# Patient Record
Sex: MALE | Race: BLACK OR AFRICAN AMERICAN | NOT HISPANIC OR LATINO | Employment: OTHER | URBAN - METROPOLITAN AREA
[De-identification: names, ages, dates, MRNs, and addresses within clinical notes are randomized per-mention and may not be internally consistent; named-entity substitution may affect disease eponyms.]

---

## 2017-01-11 ENCOUNTER — HOSPITAL ENCOUNTER (OUTPATIENT)
Dept: SLEEP MEDICINE | Facility: HOSPITAL | Age: 47
Discharge: HOME | End: 2017-01-11
Attending: INTERNAL MEDICINE | Admitting: INTERNAL MEDICINE

## 2017-01-11 PROCEDURE — G0463 HOSPITAL OUTPT CLINIC VISIT: HCPCS

## 2017-01-11 PROCEDURE — 99213 OFFICE O/P EST LOW 20 MIN: CPT | Performed by: INTERNAL MEDICINE

## 2017-01-28 PROBLEM — Z99.89 OSA ON CPAP: Status: ACTIVE | Noted: 2017-01-28

## 2017-01-28 PROBLEM — G47.33 OSA ON CPAP: Status: ACTIVE | Noted: 2017-01-28

## 2017-01-28 PROBLEM — G47.14 HYPERSOMNIA DUE TO MEDICAL CONDITION: Status: ACTIVE | Noted: 2017-01-28

## 2017-01-28 NOTE — PROGRESS NOTES
[facility] Note January 11, 2017  Patient Care Team:  Yuki Montiel MD as PCP - General  Yuki Montiel MD as PCP - Family Medicine    Chief Complaint:  ELENA     Interval History:   The patient was last seen by me in May 2016.  He remained stable without new complaints.  He goes to bed between 11 PM and midnight and awakens at 9 AM.  He sleeps all night.  [place] Sleepiness Scale is normal 3.    Review of Systems:  Recorded on the Sleep Questionnaire.  Unremarkable.    Social History:  He does not smoke cigarettes.  No alcohol.  No caffeine.    Allergies:  No known medical allergies.     Medication Review:  His list was reviewed.  He takes Nuvigil 250 mg one half tab by mouth every morning.    Vital Signs:  Height 71 inches and weight 235 and he is obese with a body mass index of 33.    Physical Exam:    Constitutional:  Well developed black male and appears in no apparent distress.  Awake & oriented times 3.  Normal mood with normal recent and remote memory and normal judgement.  Eyes:  Conjunctivae normal.  Oropharynx:  moist mucous membranes without exudate and a large tongue and class III-IV MP airway and posterior pharyngeal region not well seen.      Results Review:  DME is [facility] and he uses a fullface mask.  Downloads between December 12, 2016 and January 10, 2017 reveals compliance to be 100% average usage 6 hours and 45 minutes and average AHI is normal despite the patient having a large leak.  The patient uses CPAP +12.       Impression:   Obstructive sleep apnea adequately treated with CPAP +12 with good compliance and usage.  The patient has persistent hypersomnolence treated with Nuvigil 250 mg one half tablet every morning.      Plan:  Good sleep hygiene measures should be maintained weight loss would be beneficial.  The patient will continue CPAP +12 as he is doing.  She will also continue Nuvigil as prescribed and a new refill given today.  The patient will  follow-up in 8 or 9 months.      Neil Lam MD  01/28/17  1:32 PM

## 2017-04-28 RX ORDER — LOSARTAN POTASSIUM AND HYDROCHLOROTHIAZIDE 25; 100 MG/1; MG/1
TABLET ORAL
Qty: 30 TABLET | Refills: 0 | Status: SHIPPED | OUTPATIENT
Start: 2017-04-28 | End: 2017-10-13

## 2017-04-28 RX ORDER — AMLODIPINE BESYLATE 10 MG/1
TABLET ORAL
Qty: 30 TABLET | Refills: 0 | Status: SHIPPED | OUTPATIENT
Start: 2017-04-28 | End: 2017-10-26 | Stop reason: SDUPTHER

## 2017-06-09 RX ORDER — AMLODIPINE BESYLATE 10 MG/1
TABLET ORAL
Qty: 30 TABLET | OUTPATIENT
Start: 2017-06-09

## 2017-06-09 RX ORDER — LOSARTAN POTASSIUM AND HYDROCHLOROTHIAZIDE 25; 100 MG/1; MG/1
TABLET ORAL
Qty: 30 TABLET | OUTPATIENT
Start: 2017-06-09

## 2017-09-13 ENCOUNTER — HOSPITAL ENCOUNTER (OUTPATIENT)
Dept: SLEEP MEDICINE | Facility: HOSPITAL | Age: 47
Discharge: HOME | End: 2017-09-13
Admitting: INTERNAL MEDICINE

## 2017-09-13 PROCEDURE — 99213 OFFICE O/P EST LOW 20 MIN: CPT | Performed by: INTERNAL MEDICINE

## 2017-09-13 PROCEDURE — G0463 HOSPITAL OUTPT CLINIC VISIT: HCPCS

## 2017-09-16 NOTE — PROGRESS NOTES
[facility] Note       Patient Care Team:  Yuki Montiel MD as PCP - Family Medicine  Neil Lam MD as Consulting Physician (Sleep Medicine)  Izaiah Nino MD as Consulting Physician (Neurosurgery)    Chief Complaint:  ELENA     Interval History:   The patient was last seen by me in January of this year.  He is stable and unchanged.  He goes to bed 11 PM and awakens at 8:30 AM.  He awakens once during the nighttime.  [place] Sleepiness Scale is normal at 3.    Review of Systems:  Recorded on the Sleep Questionnaire.  Unremarkable .    Social History:  He does not smoke cigarettes.  No alcohol and no caffeine.    Allergies:  No known medical allergies.     Medication Review:  His list was reviewed.  He takes Nuvigil 250 mg, one half tablet every morning.    Vital Signs:  Height 71 inches and weight 250 he needs obese with a body mass index of 35.    Physical Exam:    Constitutional:  Well developed black male and appears in no apparent distress.  Awake & oriented times 3.  Normal mood with normal recent and remote memory and normal judgement.  Eyes:  Conjunctivae normal.  Oropharynx:  moist mucous membranes without exudate and a large tongue and class III-IV MP airway and posterior pharyngeal region not well seen.      Results Review:  DME is [facility] and he uses a fullface mask.  Downloads obtained were between December 12, 2016 and January 10, 2017.  Compliance was demonstrated and he was adequately treated.       Impression:   Obstructive sleep apnea adequately treated with CPAP +12 with good compliance and usage.  The patient has persistent complaints of hypersomnolence that is adequately treated with Nuvigil 250 mg, one half tab every morning.      Plan:  Good sleep hygiene measures should be maintained.  Weight loss would be beneficial in this patient who is obese by BMI.  The patient is benefiting from the treatment being provided.     The patient will continue CPAP +12 and  Nuvigil as outlined.    The patient will call for any problems and will follow up in 8-9 months.      Neil Lam MD  09/16/17  12:55 PM

## 2017-10-13 ENCOUNTER — OFFICE VISIT (OUTPATIENT)
Dept: FAMILY MEDICINE CLINIC | Facility: CLINIC | Age: 47
End: 2017-10-13

## 2017-10-13 VITALS
DIASTOLIC BLOOD PRESSURE: 70 MMHG | HEIGHT: 70 IN | RESPIRATION RATE: 18 BRPM | BODY MASS INDEX: 32.07 KG/M2 | OXYGEN SATURATION: 98 % | HEART RATE: 79 BPM | SYSTOLIC BLOOD PRESSURE: 110 MMHG | WEIGHT: 224 LBS

## 2017-10-13 DIAGNOSIS — E04.9 GOITER: ICD-10-CM

## 2017-10-13 DIAGNOSIS — E78.5 HYPERLIPIDEMIA, UNSPECIFIED HYPERLIPIDEMIA TYPE: ICD-10-CM

## 2017-10-13 DIAGNOSIS — E11.9 TYPE 2 DIABETES MELLITUS WITHOUT COMPLICATION, WITHOUT LONG-TERM CURRENT USE OF INSULIN (HCC): ICD-10-CM

## 2017-10-13 DIAGNOSIS — R79.89 SERUM CREATININE RAISED: ICD-10-CM

## 2017-10-13 DIAGNOSIS — E55.9 HYPOVITAMINOSIS D: ICD-10-CM

## 2017-10-13 DIAGNOSIS — Z23 ENCOUNTER FOR IMMUNIZATION: ICD-10-CM

## 2017-10-13 DIAGNOSIS — Z79.899 DRUG THERAPY: ICD-10-CM

## 2017-10-13 DIAGNOSIS — H54.8 LEGAL BLINDNESS: ICD-10-CM

## 2017-10-13 DIAGNOSIS — I15.2 HYPERTENSION DUE TO ENDOCRINE DISORDER: Primary | ICD-10-CM

## 2017-10-13 DIAGNOSIS — E03.9 HYPOTHYROIDISM, UNSPECIFIED TYPE: ICD-10-CM

## 2017-10-13 PROCEDURE — 99214 OFFICE O/P EST MOD 30 MIN: CPT | Performed by: FAMILY MEDICINE

## 2017-10-13 PROCEDURE — G0008 ADMIN INFLUENZA VIRUS VAC: HCPCS | Performed by: FAMILY MEDICINE

## 2017-10-13 RX ORDER — AMLODIPINE BESYLATE 10 MG/1
TABLET ORAL
COMMUNITY
Start: 2013-08-02 | End: 2017-10-13

## 2017-10-13 RX ORDER — TESTOSTERONE 16.2 MG/G
GEL TRANSDERMAL
COMMUNITY
Start: 2013-08-02 | End: 2022-11-29 | Stop reason: SDUPTHER

## 2017-10-13 RX ORDER — ARMODAFINIL 150 MG/1
0.5 TABLET ORAL DAILY
COMMUNITY
Start: 2013-08-02 | End: 2018-01-13

## 2017-10-13 RX ORDER — HYDROCORTISONE 10 MG/1
0.5 TABLET ORAL 2 TIMES DAILY
COMMUNITY
Start: 2013-08-02 | End: 2017-10-13

## 2017-10-13 RX ORDER — LOSARTAN POTASSIUM AND HYDROCHLOROTHIAZIDE 25; 100 MG/1; MG/1
TABLET ORAL DAILY
COMMUNITY
Start: 2015-07-27 | End: 2017-10-26 | Stop reason: SDUPTHER

## 2017-10-13 RX ORDER — LEVOTHYROXINE SODIUM 0.05 MG/1
TABLET ORAL DAILY
COMMUNITY
Start: 2013-08-02

## 2017-10-13 RX ORDER — LEVETIRACETAM 500 MG/1
TABLET ORAL
COMMUNITY
Start: 2015-03-27 | End: 2022-11-29 | Stop reason: SDUPTHER

## 2017-10-13 RX ORDER — HYDROCODONE BITARTRATE AND ACETAMINOPHEN 7.5; 325 MG/1; MG/1
TABLET ORAL
COMMUNITY
Start: 2015-03-27 | End: 2017-10-13

## 2017-10-13 RX ORDER — SIMVASTATIN 10 MG
10 TABLET ORAL
COMMUNITY
End: 2022-11-29 | Stop reason: SDUPTHER

## 2017-10-13 RX ORDER — HYDROCORTISONE 20 MG/1
10 TABLET ORAL
COMMUNITY
End: 2017-10-13

## 2017-10-13 RX ORDER — ARMODAFINIL 250 MG/1
250 TABLET ORAL
COMMUNITY
End: 2017-10-13

## 2017-10-13 NOTE — PROGRESS NOTES
"Subjective   Bernard Cruz Jr. is a 47 y.o. male.     History of Present Illness Has not seen me since 3/15. Here to fu on hypertension. He is sure his BPs have been good at other dr offices. Doing well in general.    He wonders if he can decrease his BP meds.Would like to stop them  Walks, lifts weights daily.  Just has had labs done by endocrinology- Dr Arnold- they are not in our chart.    The following portions of the patient's history were reviewed and updated as appropriate: allergies, current medications, past social history and problem list.    Review of Systems   Constitutional: Negative for activity change, appetite change and unexpected weight change.   HENT: Negative for nosebleeds and trouble swallowing.    Eyes: Positive for visual disturbance. Negative for pain.   Respiratory: Negative for chest tightness, shortness of breath and wheezing.    Cardiovascular: Negative for chest pain and palpitations.   Gastrointestinal: Negative for abdominal pain and blood in stool.   Endocrine: Negative.    Genitourinary: Negative for difficulty urinating and hematuria.   Musculoskeletal: Negative for joint swelling.   Skin: Negative for color change and rash.   Allergic/Immunologic: Negative.    Neurological: Negative for syncope and speech difficulty.   Hematological: Negative for adenopathy.   Psychiatric/Behavioral: Positive for decreased concentration and dysphoric mood. Negative for agitation and confusion. The patient is nervous/anxious.    All other systems reviewed and are negative.      Objective   /70  Pulse 79  Resp 18  Ht 70\" (177.8 cm)  Wt 224 lb (102 kg)  SpO2 98%  BMI 32.14 kg/m2  Physical Exam   Constitutional: He is oriented to person, place, and time. Vital signs are normal. He appears well-developed and well-nourished. No distress.   Overall pleasant, but resistant to suggestions for medical tests of any kind. Here with his mother who is supportive of medical testing in gen (including " phlebotomy and immunizations)   HENT:   Head: Normocephalic.   Neck: Carotid bruit is not present. Thyromegaly (min bilat enl) present.   No bruits   Cardiovascular: Normal rate, regular rhythm and normal heart sounds.    Pulmonary/Chest: Effort normal and breath sounds normal.   Abdominal: Soft. Bowel sounds are normal. He exhibits no distension.   Lymphadenopathy:     He has no cervical adenopathy.   Neurological: He is alert and oriented to person, place, and time. Gait normal.   Psychiatric:   See constitutional   Vitals reviewed.      Assessment/Plan   Problem List Items Addressed This Visit        Cardiovascular and Mediastinum    HTN (hypertension) - Primary    Relevant Medications    losartan-hydrochlorothiazide (HYZAAR) 100-25 MG per tablet       Endocrine    DM (diabetes mellitus)    Goiter    Relevant Medications    levothyroxine (SYNTHROID, LEVOTHROID) 50 MCG tablet       Other    Serum creatinine raised    Legal blindness      Other Visit Diagnoses     Hypovitaminosis D        Relevant Orders    Vitamin D 25 Hydroxy (Completed)    Hypothyroidism, unspecified type        Relevant Medications    levothyroxine (SYNTHROID, LEVOTHROID) 50 MCG tablet    Other Relevant Orders    TSH (Completed)    Hyperlipidemia, unspecified hyperlipidemia type        Relevant Medications    simvastatin (ZOCOR) 10 MG tablet    Other Relevant Orders    Lipid Panel With / Chol / HDL Ratio (Completed)    Drug therapy        Relevant Orders    CBC & Differential (Completed)    Comprehensive Metabolic Panel (Completed)    Encounter for immunization        Relevant Orders    Flu Vaccine Quad PF >18YR (Completed)           He can break the losartan HCTZ in half and send me BPs in a month. He says he would like to get off BP medication. Tod him would need to do that in a stepwise manner, checking BP frequently. He and mother seem to understand that.

## 2017-10-14 LAB
25(OH)D3+25(OH)D2 SERPL-MCNC: 43.8 NG/ML (ref 30–100)
ALBUMIN SERPL-MCNC: 4.7 G/DL (ref 3.5–5.2)
ALBUMIN/GLOB SERPL: 1.4 G/DL
ALP SERPL-CCNC: 49 U/L (ref 39–117)
ALT SERPL-CCNC: 16 U/L (ref 1–41)
AST SERPL-CCNC: 20 U/L (ref 1–40)
BASOPHILS # BLD AUTO: 0.04 10*3/MM3 (ref 0–0.2)
BASOPHILS NFR BLD AUTO: 0.4 % (ref 0–1.5)
BILIRUB SERPL-MCNC: 0.4 MG/DL (ref 0.1–1.2)
BUN SERPL-MCNC: 16 MG/DL (ref 6–20)
BUN/CREAT SERPL: 11.8 (ref 7–25)
CALCIUM SERPL-MCNC: 10.4 MG/DL (ref 8.6–10.5)
CHLORIDE SERPL-SCNC: 99 MMOL/L (ref 98–107)
CHOLEST SERPL-MCNC: 192 MG/DL (ref 0–200)
CHOLEST/HDLC SERPL: 3.1 {RATIO}
CO2 SERPL-SCNC: 29.6 MMOL/L (ref 22–29)
CREAT SERPL-MCNC: 1.36 MG/DL (ref 0.76–1.27)
EOSINOPHIL # BLD AUTO: 0.09 10*3/MM3 (ref 0–0.7)
EOSINOPHIL NFR BLD AUTO: 1 % (ref 0.3–6.2)
ERYTHROCYTE [DISTWIDTH] IN BLOOD BY AUTOMATED COUNT: 15.4 % (ref 11.5–14.5)
GLOBULIN SER CALC-MCNC: 3.3 GM/DL
GLUCOSE SERPL-MCNC: 93 MG/DL (ref 65–99)
HCT VFR BLD AUTO: 49.6 % (ref 40.4–52.2)
HDLC SERPL-MCNC: 62 MG/DL (ref 40–60)
HGB BLD-MCNC: 16.5 G/DL (ref 13.7–17.6)
IMM GRANULOCYTES # BLD: 0.04 10*3/MM3 (ref 0–0.03)
IMM GRANULOCYTES NFR BLD: 0.4 % (ref 0–0.5)
LDLC SERPL CALC-MCNC: 111 MG/DL (ref 0–100)
LYMPHOCYTES # BLD AUTO: 2.48 10*3/MM3 (ref 0.9–4.8)
LYMPHOCYTES NFR BLD AUTO: 26.5 % (ref 19.6–45.3)
MCH RBC QN AUTO: 31.5 PG (ref 27–32.7)
MCHC RBC AUTO-ENTMCNC: 33.3 G/DL (ref 32.6–36.4)
MCV RBC AUTO: 94.8 FL (ref 79.8–96.2)
MONOCYTES # BLD AUTO: 0.46 10*3/MM3 (ref 0.2–1.2)
MONOCYTES NFR BLD AUTO: 4.9 % (ref 5–12)
NEUTROPHILS # BLD AUTO: 6.24 10*3/MM3 (ref 1.9–8.1)
NEUTROPHILS NFR BLD AUTO: 66.8 % (ref 42.7–76)
PLATELET # BLD AUTO: 246 10*3/MM3 (ref 140–500)
POTASSIUM SERPL-SCNC: 4.6 MMOL/L (ref 3.5–5.2)
PROT SERPL-MCNC: 8 G/DL (ref 6–8.5)
RBC # BLD AUTO: 5.23 10*6/MM3 (ref 4.6–6)
SODIUM SERPL-SCNC: 141 MMOL/L (ref 136–145)
TRIGL SERPL-MCNC: 93 MG/DL (ref 0–150)
TSH SERPL DL<=0.005 MIU/L-ACNC: 1.1 MIU/ML (ref 0.27–4.2)
VLDLC SERPL CALC-MCNC: 18.6 MG/DL (ref 5–40)
WBC # BLD AUTO: 9.35 10*3/MM3 (ref 4.5–10.7)

## 2017-10-15 PROBLEM — K21.9 GASTROESOPHAGEAL REFLUX DISEASE: Status: ACTIVE | Noted: 2017-10-15

## 2017-10-15 PROBLEM — R25.2 MUSCLE CRAMPS: Status: ACTIVE | Noted: 2017-10-15

## 2017-10-15 PROBLEM — I10 HTN (HYPERTENSION): Status: ACTIVE | Noted: 2017-10-15

## 2017-10-15 PROBLEM — E89.3: Status: ACTIVE | Noted: 2017-10-15

## 2017-10-15 PROBLEM — I26.99 PULMONARY EMBOLISM WITH INFARCTION: Status: ACTIVE | Noted: 2017-10-15

## 2017-10-15 PROBLEM — I82.409 DVT (DEEP VENOUS THROMBOSIS) (HCC): Status: ACTIVE | Noted: 2017-10-15

## 2017-10-15 PROBLEM — D35.2 PITUITARY ADENOMA (HCC): Status: ACTIVE | Noted: 2017-10-15

## 2017-10-15 PROBLEM — I63.9 CEREBROVASCULAR ACCIDENT (HCC): Status: ACTIVE | Noted: 2017-10-15

## 2017-10-15 PROBLEM — E55.9 VITAMIN D DEFICIENCY: Status: ACTIVE | Noted: 2017-10-15

## 2017-10-15 PROBLEM — R79.89 SERUM CREATININE RAISED: Status: ACTIVE | Noted: 2017-10-15

## 2017-10-15 PROBLEM — E78.5 DYSLIPIDEMIA: Status: ACTIVE | Noted: 2017-10-15

## 2017-10-15 PROBLEM — M19.90 OSTEOARTHRITIS: Status: ACTIVE | Noted: 2017-10-15

## 2017-10-15 PROBLEM — H54.8 LEGAL BLINDNESS: Status: ACTIVE | Noted: 2017-10-15

## 2017-10-26 ENCOUNTER — TELEPHONE (OUTPATIENT)
Dept: FAMILY MEDICINE CLINIC | Facility: CLINIC | Age: 47
End: 2017-10-26

## 2017-10-26 RX ORDER — LOSARTAN POTASSIUM AND HYDROCHLOROTHIAZIDE 25; 100 MG/1; MG/1
1 TABLET ORAL DAILY
Qty: 90 TABLET | Refills: 4 | Status: SHIPPED | OUTPATIENT
Start: 2017-10-26 | End: 2018-11-27 | Stop reason: SDUPTHER

## 2017-10-26 RX ORDER — AMLODIPINE BESYLATE 10 MG/1
10 TABLET ORAL DAILY
Qty: 90 TABLET | Refills: 4 | Status: SHIPPED | OUTPATIENT
Start: 2017-10-26 | End: 2018-11-27 | Stop reason: SDUPTHER

## 2017-10-26 NOTE — TELEPHONE ENCOUNTER
"Patients father has requested blood pressure medication for his son Anthony Shields Twice now. I told patient's father that Anthony Shields Has not requested his blood pressure medication, and he should be the one to do so. Anthony Daniels Stated that \"he has high blood pressure and his son needs to be on blood pressure medication because he will have blood pressure too.\" I told Anthony Daniels That I would relay the message but it is up to Anthony Shields To decide what medications he wants to request and take.   "

## 2018-01-13 RX ORDER — ARMODAFINIL 250 MG/1
TABLET ORAL
Qty: 90 TABLET | Refills: 1 | Status: SHIPPED | OUTPATIENT
Start: 2018-01-13 | End: 2019-01-07

## 2018-02-01 ENCOUNTER — OFFICE VISIT (OUTPATIENT)
Dept: FAMILY MEDICINE CLINIC | Facility: CLINIC | Age: 48
End: 2018-02-01

## 2018-02-01 VITALS
SYSTOLIC BLOOD PRESSURE: 126 MMHG | WEIGHT: 235.6 LBS | DIASTOLIC BLOOD PRESSURE: 86 MMHG | HEART RATE: 79 BPM | HEIGHT: 70 IN | OXYGEN SATURATION: 98 % | TEMPERATURE: 98.2 F | BODY MASS INDEX: 33.73 KG/M2

## 2018-02-01 DIAGNOSIS — Z95.828 S/P IVC FILTER: ICD-10-CM

## 2018-02-01 DIAGNOSIS — I15.2 HYPERTENSION DUE TO ENDOCRINE DISORDER: Primary | ICD-10-CM

## 2018-02-01 PROBLEM — R25.2 MUSCLE CRAMPS: Status: RESOLVED | Noted: 2017-10-15 | Resolved: 2018-02-01

## 2018-02-01 PROBLEM — M19.90 OSTEOARTHRITIS: Status: RESOLVED | Noted: 2017-10-15 | Resolved: 2018-02-01

## 2018-02-01 PROBLEM — G47.14 HYPERSOMNIA DUE TO MEDICAL CONDITION: Status: RESOLVED | Noted: 2017-01-28 | Resolved: 2018-02-01

## 2018-02-01 PROBLEM — E89.3: Status: RESOLVED | Noted: 2017-10-15 | Resolved: 2018-02-01

## 2018-02-01 PROCEDURE — 99214 OFFICE O/P EST MOD 30 MIN: CPT | Performed by: FAMILY MEDICINE

## 2018-02-01 RX ORDER — HYDROCORTISONE 10 MG/1
10 TABLET ORAL DAILY
COMMUNITY
Start: 2017-12-27

## 2018-02-01 NOTE — PROGRESS NOTES
Subjective   Bernard Cruz Jr. is a 47 y.o. male.     Chief Complaint   Patient presents with   • Establish Care     new pt establishing in office today   • Hypertension     follow up       HPI     Patient is a pleasant 47-year-old male here to establish care.  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    He had an IVC filter placed and April 2014 by Dr. Andrew Dalal.    HTN: well controlled on Amlopidine 10mg QD  Elevated fasting glucose without the diagnosis of DM       Medical management:  Endo: Dr Manpreet Garrido   Neurology: Dr. Pickard   Psych: Dr. Neil Lam       The following portions of the patient's history were reviewed and updated as appropriate: allergies, current medications, past family history, past medical history, past social history, past surgical history and problem list.    Review of Systems   Constitutional: Negative for fever and unexpected weight change.   HENT: Negative for dental problem.    Respiratory: Negative for shortness of breath.    Cardiovascular: Negative for chest pain.   Gastrointestinal: Negative for blood in stool.   Genitourinary: Negative for dysuria.   Skin: Negative for rash.   Allergic/Immunologic: Negative for environmental allergies.   Neurological: Negative for syncope.   Psychiatric/Behavioral: The patient is not nervous/anxious.    All other systems reviewed and are negative.      Objective  Vitals:    02/01/18 1013   BP: 126/86   Pulse: 79   Temp: 98.2 °F (36.8 °C)   SpO2: 98%        Physical Exam   Constitutional: He is oriented to person, place, and time.  He appears well-developed and well-nourished. No distress.   HENT:   Head: Normocephalic.   Nose: Nose normal.   Eyes: EOM are normal.   Cardiovascular: Normal rate, regular rhythm, normal heart sounds and intact distal pulses.    No murmur heard.  Pulmonary/Chest: Effort normal and breath sounds normal. No respiratory distress.   Musculoskeletal: Normal range of motion.   Neurological: He is alert and oriented to person, place, and time.   Skin: Skin is warm and dry. No rash noted.   Psychiatric: He has a normal mood and affect. His behavior is normal. Judgment and thought content normal.   Nursing note and vitals reviewed.        Current Outpatient Prescriptions:   •  amLODIPine (NORVASC) 10 MG tablet, Take 1 tablet by mouth Daily., Disp: 90 tablet, Rfl: 4  •  Armodafinil 250 MG tablet, Take 1/2 or 1 tab PO daily, Disp: 90 tablet, Rfl: 1  •  levETIRAcetam (KEPPRA) 500 MG tablet, Take  by mouth., Disp: , Rfl:   •  levothyroxine (SYNTHROID, LEVOTHROID) 50 MCG tablet, Take  by mouth Daily., Disp: , Rfl:   •  losartan-hydrochlorothiazide (HYZAAR) 100-25 MG per tablet, Take 1 tablet by mouth Daily., Disp: 90 tablet, Rfl: 4  •  simvastatin (ZOCOR) 10 MG tablet, Take 10 mg by mouth., Disp: , Rfl:   •  Testosterone (ANDROGEL PUMP) 20.25 MG/ACT (1.62%) gel, Place  on the skin., Disp: , Rfl:   •  hydrocortisone (CORTEF) 10 MG tablet, Take 10 mg by mouth Daily., Disp: , Rfl:     Procedures    Lab Results (most recent)     None          Assessment/Plan   Bernard was seen today for establish care and hypertension.    Diagnoses and all orders for this visit:    Hypertension due to endocrine disorder    S/P IVC filter      Patient is doing well currently, no complaints.  Blood pressure well controlled, continue current regimen with amlodipine 10 mg.    He does have an IVC filter that was placed in 2012, to my knowledge they should be removed.  Pt doesn't seem open at the moment, at next apt recommend consult to vascularl surgery  to see if IVC filter should be removed or not.    Return in about 6 months (around 8/1/2018) for Recheck HTN .    20 minutes was spent of the 30 minute visit in direct counseling and coordination of care regarding:   Encounter Diagnoses   Name Primary?   • Hypertension due to endocrine disorder Yes   • S/P IVC filter          Bushra Ruiz MD

## 2018-05-23 ENCOUNTER — OFFICE VISIT (OUTPATIENT)
Dept: SLEEP MEDICINE | Facility: HOSPITAL | Age: 48
End: 2018-05-23
Attending: INTERNAL MEDICINE

## 2018-05-23 DIAGNOSIS — Z99.89 OSA ON CPAP: Primary | ICD-10-CM

## 2018-05-23 DIAGNOSIS — G47.33 OSA ON CPAP: Primary | ICD-10-CM

## 2018-05-23 DIAGNOSIS — G47.14 HYPERSOMNIA DUE TO MEDICAL CONDITION: ICD-10-CM

## 2018-05-23 PROCEDURE — G0463 HOSPITAL OUTPT CLINIC VISIT: HCPCS

## 2018-05-23 PROCEDURE — 99213 OFFICE O/P EST LOW 20 MIN: CPT | Performed by: INTERNAL MEDICINE

## 2018-05-23 NOTE — PROGRESS NOTES
[facility] Note     Chief Complaint:  ELENA     Primary Care Physician: Bushra Ruiz MD    Interval History:   The patient was last seen by me in September 2017.  He is stable without new complaints.  He goes to bed at midnight and awakens at 8 AM.  [place] Sleepiness Scale is normal at 3.    Review of Systems:  Recorded on the Sleep Questionnaire.  Unremarkable .    Social History:  No caffeine  Social History     Social History   • Marital status: Single     Social History Main Topics   • Smoking status: Never Smoker   • Alcohol use No   • Drug use: No   • Sexual activity: Yes     Other Topics Concern   • Not on file       Allergies:  Patient has no known allergies.     Medication Review:  His list was reviewed.  He takes Nuvigil 250 mg one half tab every morning    Vital Signs:  Height 71 inches, weight 230 pounds and BMI obese at 32-33.    Physical Exam:    Constitutional:  Well developed black male and appears in no apparent distress.  Awake & oriented times 3.  Normal mood with normal recent and remote memory and normal judgement.  Eyes:  Conjunctivae normal.  Oropharynx:  moist mucous membranes without exudate and a large tongue and class III-IV MP airway      Results Review:  DME is [employer] and he uses a fullface mask.  Downloads between May 24 and November 19, 2017 compliances 100%.  Average usage is 6 hours and 50 minutes.  Average AHI is normal with a leak of 1 hour and 52 minutes.  CPAP pressure is +12.       Impression:   Obstructive sleep apnea adequately treated with CPAP +12 with good compliance and usage.  The patient has persistent complaints of hypersomnolence that is adequately treated with Nuvigil 250 mg, one half tab every morning.      Plan:  Good sleep hygiene measures should be maintained.  Weight loss would be beneficial in this patient who is obese by BMI.  The patient is benefiting from the treatment being provided.     The patient will continue CPAP +12 and Nuvigil as  described.    The patient will call for any problems and will follow up in 8 months.      Neil Lam MD  Sleep Medicine  05/23/18  10:39 AM

## 2018-11-16 NOTE — TELEPHONE ENCOUNTER
Pt requesting two refills: losartan and amlodipine. Please send ninety day supply to [facility].

## 2018-11-27 RX ORDER — LOSARTAN POTASSIUM AND HYDROCHLOROTHIAZIDE 25; 100 MG/1; MG/1
1 TABLET ORAL DAILY
Qty: 90 TABLET | Refills: 4 | Status: SHIPPED | OUTPATIENT
Start: 2018-11-27 | End: 2022-05-09 | Stop reason: SDUPTHER

## 2018-11-27 RX ORDER — AMLODIPINE BESYLATE 10 MG/1
10 TABLET ORAL DAILY
Qty: 90 TABLET | Refills: 4 | Status: SHIPPED | OUTPATIENT
Start: 2018-11-27 | End: 2022-05-09 | Stop reason: SDUPTHER

## 2019-01-07 RX ORDER — ARMODAFINIL 250 MG/1
TABLET ORAL
Qty: 90 TABLET | Refills: 1 | Status: SHIPPED | OUTPATIENT
Start: 2019-01-07 | End: 2019-07-05 | Stop reason: SDUPTHER

## 2019-01-16 ENCOUNTER — OFFICE VISIT (OUTPATIENT)
Dept: SLEEP MEDICINE | Facility: HOSPITAL | Age: 49
End: 2019-01-16
Attending: INTERNAL MEDICINE

## 2019-01-16 VITALS — WEIGHT: 236 LBS | BODY MASS INDEX: 33.04 KG/M2 | HEIGHT: 71 IN

## 2019-01-16 DIAGNOSIS — G47.14 HYPERSOMNIA DUE TO MEDICAL CONDITION: ICD-10-CM

## 2019-01-16 DIAGNOSIS — G47.33 OSA ON CPAP: Primary | ICD-10-CM

## 2019-01-16 DIAGNOSIS — Z99.89 OSA ON CPAP: Primary | ICD-10-CM

## 2019-01-16 PROCEDURE — G0463 HOSPITAL OUTPT CLINIC VISIT: HCPCS

## 2019-01-16 PROCEDURE — 99213 OFFICE O/P EST LOW 20 MIN: CPT | Performed by: INTERNAL MEDICINE

## 2019-01-16 NOTE — PROGRESS NOTES
"Follow Up [facility] Disorders [facility] Note     Chief Complaint:  ELENA     Primary Care Physician: Bushra Ruiz MD    Interval History:   I last saw the patient in May.  He is stable without new complaints.  He goes to bed at midnight and awakens at 9 AM.  With CPAP and Nuvigil he has no significant complaints of hypersomnolence    Review of Systems:  Recorded on the Sleep Questionnaire.  Unremarkable     Social History:    Social History     Socioeconomic History   • Marital status: Single     Spouse name: Not on file   • Number of children: Not on file   • Years of education: Not on file   • Highest education level: Not on file   Tobacco Use   • Smoking status: Never Smoker   Substance and Sexual Activity   • Alcohol use: No   • Drug use: No   • Sexual activity: Yes       Allergies:  Patient has no known allergies.     Medication Review:  Reviewed.  Nuvigil 250 mg one half tab daily    Vital Signs:    Vitals:    01/16/19 1040   Weight: 107 kg (236 lb)   Height: 180.3 cm (71\")     Body mass index is 32.92 kg/m².    Physical Exam:    Constitutional:  Well developed 48 y.o. male that appears in no apparent distress.  Awake & oriented times 3.  Normal mood with normal recent and remote memory and normal judgement.  Eyes:  Conjunctivae normal.  Oropharynx:  moist mucous membranes without exudate and a large tongue and class III-IV MP airway      Results Review:  DME is [employer] and he uses a fullface mask.  Downloads between October 18 and January 15, 2019 compliance 100%.  Average usage is 7 hours and 18 minutes.  Average AHI is normal with a leak of 44 minutes.  CPAP pressure is +12.       Impression:   Obstructive sleep apnea adequately treated with CPAP +12 with good compliance and usage and Nuvigil 250 mg one half tablet with no significant complaints of hypersomnolence.    Plan:  Good sleep hygiene measures should be maintained.  Weight loss would be beneficial in this patient who is obese by BMI.  The patient is " benefiting from the treatment being provided.     The patient will continue CPAP +12 and Nuvigil 250 mg one half tab daily.    The patient will call for any problems and will follow up in 6 months.      Neil Lam MD  Sleep Medicine  01/16/19  11:22 AM

## 2019-07-05 RX ORDER — ARMODAFINIL 250 MG/1
TABLET ORAL
Qty: 90 TABLET | Refills: 1 | Status: SHIPPED | OUTPATIENT
Start: 2019-07-05 | End: 2020-12-03 | Stop reason: SDUPTHER

## 2019-07-10 ENCOUNTER — OFFICE VISIT (OUTPATIENT)
Dept: SLEEP MEDICINE | Facility: HOSPITAL | Age: 49
End: 2019-07-10
Attending: INTERNAL MEDICINE

## 2019-07-10 VITALS — HEIGHT: 70 IN | BODY MASS INDEX: 32.21 KG/M2 | WEIGHT: 225 LBS

## 2019-07-10 DIAGNOSIS — Z99.89 OSA ON CPAP: Primary | ICD-10-CM

## 2019-07-10 DIAGNOSIS — G47.33 OSA ON CPAP: Primary | ICD-10-CM

## 2019-07-10 DIAGNOSIS — G47.14 HYPERSOMNIA DUE TO MEDICAL CONDITION: ICD-10-CM

## 2019-07-10 PROCEDURE — G0463 HOSPITAL OUTPT CLINIC VISIT: HCPCS

## 2019-07-10 PROCEDURE — 99213 OFFICE O/P EST LOW 20 MIN: CPT | Performed by: INTERNAL MEDICINE

## 2019-07-10 NOTE — PROGRESS NOTES
"Follow Up [facility] Note     Chief Complaint:  ELENA     Primary Care Physician: Bushra Ruiz MD    Interval History:   I last saw the patient in January of this year.  He is unchanged.  He goes to bed at midnight and awakens at 8 AM.  [place] Sleepiness Scale is normal at 4.    Review of Systems:    A complete review of systems was done and all were negative with the exception of the above    Social History:    Social History     Socioeconomic History   • Marital status: Single     Spouse name: Not on file   • Number of children: Not on file   • Years of education: Not on file   • Highest education level: Not on file   Tobacco Use   • Smoking status: Never Smoker   Substance and Sexual Activity   • Alcohol use: No   • Drug use: No   • Sexual activity: Yes       Allergies:  Patient has no known allergies.     Medication Review: His list was reviewed.  The patient takes Nuvigil to 50 mg 1/2 tablet every day    Vital Signs:    Vitals:    07/10/19 1113   Weight: 102 kg (225 lb)   Height: 177.8 cm (70\")     Body mass index is 32.28 kg/m².    Physical Exam:    Constitutional:  Well developed 48 y.o. male that appears in no apparent distress.  Awake & oriented times 3.  Normal mood with normal recent and remote memory and normal judgement.  Eyes:  Conjunctivae normal.  Oropharynx:  moist mucous membranes without exudate and a large tongue and class III-4 MP airway     Results Review:  DME is [employer] and he uses a full facemask.  Downloads between 4/11 and 7/9/2019 compliance 99%.  Average usage is 5 hours and 58 minutes.  Average AHI is normal with a leak of 1 hour and 14 minutes.  CPAP pressure is +12.       Impression:   Obstructive sleep apnea adequately treated with CPAP +12 with good compliance and usage and no complaints of hypersomnolence.  Due to persistent hypersomnolence, the patient takes Nuvigil to 50 mg one half tab every morning with improvement in complaints of hypersomnolence    Plan:  Good " sleep hygiene measures should be maintained.  Weight loss would be beneficial in this patient who is obese by BMI.  The patient is benefiting from the treatment being provided.     The patient will continue auto CPAP and also continue Nuvigil as prescribed.    The patient will call for any problems and will follow up in 6 months.      Neil Lam MD  Sleep Medicine  07/10/19  12:03 PM

## 2020-03-11 ENCOUNTER — APPOINTMENT (OUTPATIENT)
Dept: SLEEP MEDICINE | Facility: HOSPITAL | Age: 50
End: 2020-03-11

## 2020-06-04 ENCOUNTER — APPOINTMENT (OUTPATIENT)
Dept: SLEEP MEDICINE | Facility: HOSPITAL | Age: 50
End: 2020-06-04

## 2020-09-02 ENCOUNTER — OFFICE VISIT (OUTPATIENT)
Dept: SLEEP MEDICINE | Facility: HOSPITAL | Age: 50
End: 2020-09-02

## 2020-09-02 VITALS — HEART RATE: 88 BPM | BODY MASS INDEX: 33.41 KG/M2 | HEIGHT: 70 IN | OXYGEN SATURATION: 98 % | WEIGHT: 233.4 LBS

## 2020-09-02 DIAGNOSIS — G47.33 OSA ON CPAP: Primary | ICD-10-CM

## 2020-09-02 DIAGNOSIS — G47.14 HYPERSOMNIA DUE TO MEDICAL CONDITION: ICD-10-CM

## 2020-09-02 DIAGNOSIS — Z99.89 OSA ON CPAP: Primary | ICD-10-CM

## 2020-09-02 PROCEDURE — 99213 OFFICE O/P EST LOW 20 MIN: CPT | Performed by: INTERNAL MEDICINE

## 2020-09-02 PROCEDURE — G0463 HOSPITAL OUTPT CLINIC VISIT: HCPCS

## 2020-09-02 NOTE — PROGRESS NOTES
"Follow Up [facility] Disorders [facility] Note     Chief Complaint:  ELENA     Primary Care Physician: Bushra Ruiz MD    Interval History:   The patient is a 50 y.o. male who I last saw in July 2019.  The patient reports he is stable.  However, he states his CPAP device stopped working within the last week.  The patient goes to bed at 11 PM and awakens at 7 AM.  He will use the bathroom during the nighttime.  [place] Sleepiness Scale is abnormal at 16.  He continues to take Nuvigil 250 mg one half tab every morning.    Review of Systems:    A complete review of systems was done and all were negative with the exception of the above    Social History:    Social History     Socioeconomic History   • Marital status: Single     Spouse name: Not on file   • Number of children: Not on file   • Years of education: Not on file   • Highest education level: Not on file   Tobacco Use   • Smoking status: Never Smoker   Substance and Sexual Activity   • Alcohol use: No   • Drug use: No   • Sexual activity: Yes       Allergies:  Patient has no known allergies.     Medication Review:  Reviewed.      Vital Signs:    Vitals:    09/02/20 1048   Pulse: 88   SpO2: 98%   Weight: 106 kg (233 lb 6.4 oz)   Height: 177.8 cm (70\")     Body mass index is 33.49 kg/m².    Physical Exam:    Constitutional:  Well developed 50 y.o. male that appears in no apparent distress.  Awake & oriented times 3.  Normal mood with normal recent and remote memory and normal judgement.  Eyes:  Conjunctivae normal.  Oropharynx: Previously, moist mucous membranes without exudate and a large tongue and class III-4 MP airway, patient is wearing a facemask.     Results Review:  DME is [employer] and he uses a fullface mask.  Downloads between 5/20 and 8/17/2020 compliance 99%.  Average usage is 5 hours and 7 minutes.  Average AHI is normal with a leak of 2 hours and 2 minutes.  CPAP pressure is +12.       Impression:   Obstructive sleep apnea adequately treated with CPAP +12 " with good compliance and usage and persistent no complaints of hypersomnolence treated with Nuvigil 250 mg one half tab every morning.    Plan:  Good sleep hygiene measures should be maintained.  Weight loss would be beneficial in this patient who is obese by BMI.  The patient is benefiting from the treatment being provided.     Since his CPAP is not functioning, a new order for a new auto CPAP between 10 and 15 cm water pressure will be placed.  All needed supplies will be obtained.    The patient will call for any problems and will follow up in 3 months.      Neil Lam MD  Sleep Medicine  09/02/20  10:53

## 2020-10-20 ENCOUNTER — TELEPHONE (OUTPATIENT)
Dept: SLEEP MEDICINE | Facility: HOSPITAL | Age: 50
End: 2020-10-20

## 2020-10-20 NOTE — TELEPHONE ENCOUNTER
Called multiple times , unable to leave  . Patient was having difficulties getting new CPAP from [facility]. Spoke with [facility], they are waiting on the Auth from insurance and will reach out to him .    [FreeTextEntry1] : Pt presents in office with high blood pressure and requesting refills on her HCTZ which controls her hypertension.\par 1. Pt informed and educated that  HCTZ requires electrolyte monitoring. Pt strongly advised to make an appointment with Dr. Brennan within three months for physical examination and blood work before leaving office today. Pt encouraged to bring her blood pressure machine with her during her next visit for comparison. \par 2. HCTZ ordered \par

## 2020-10-21 ENCOUNTER — TELEPHONE (OUTPATIENT)
Dept: SLEEP MEDICINE | Facility: HOSPITAL | Age: 50
End: 2020-10-21

## 2020-12-03 ENCOUNTER — OFFICE VISIT (OUTPATIENT)
Dept: SLEEP MEDICINE | Facility: HOSPITAL | Age: 50
End: 2020-12-03

## 2020-12-03 VITALS — HEIGHT: 70 IN | WEIGHT: 233 LBS | BODY MASS INDEX: 33.36 KG/M2

## 2020-12-03 DIAGNOSIS — Z99.89 OSA ON CPAP: Primary | ICD-10-CM

## 2020-12-03 DIAGNOSIS — G47.33 OSA ON CPAP: Primary | ICD-10-CM

## 2020-12-03 DIAGNOSIS — G47.14 HYPERSOMNIA DUE TO MEDICAL CONDITION: ICD-10-CM

## 2020-12-03 PROCEDURE — G0463 HOSPITAL OUTPT CLINIC VISIT: HCPCS

## 2020-12-03 PROCEDURE — 99213 OFFICE O/P EST LOW 20 MIN: CPT | Performed by: INTERNAL MEDICINE

## 2020-12-03 RX ORDER — ARMODAFINIL 250 MG/1
TABLET ORAL
Qty: 90 TABLET | Refills: 1 | Status: SHIPPED | OUTPATIENT
Start: 2020-12-03 | End: 2021-10-27 | Stop reason: SDUPTHER

## 2020-12-03 NOTE — PROGRESS NOTES
"Follow Up [facility] Note     Chief Complaint:  ELENA     Primary Care Physician: Bushra Ruiz MD    Interval History:   The patient is a 50 y.o. male who I last saw in September.  A new auto CPAP ordered at that time.  The patient reports he is only been using it for 2 weeks?  The patient will go to bed at midnight and awakens at 6 AM.  He will use the bathroom during the nighttime.  [place] Sleepiness Scale is normal 1.  However, due to persistent complaints of hypersomnolence, the patient does take armodafinil 250 mg daily    Review of Systems:    A complete review of systems was done and all were negative with the exception of the above    Social History:    Social History     Socioeconomic History   • Marital status: Single     Spouse name: Not on file   • Number of children: Not on file   • Years of education: Not on file   • Highest education level: Not on file   Tobacco Use   • Smoking status: Never Smoker   Substance and Sexual Activity   • Alcohol use: No   • Drug use: No   • Sexual activity: Yes       Allergies:  Patient has no known allergies.     Medication Review:  Reviewed.      Vital Signs:    Vitals:    12/03/20 1108   Weight: 106 kg (233 lb)   Height: 177.8 cm (70\")     Body mass index is 33.43 kg/m².    Physical Exam:    Constitutional:  Well developed 50 y.o. male that appears in no apparent distress.  Awake & oriented times 3.  Normal mood with normal recent and remote memory and normal judgement.  Eyes:  Conjunctivae normal.  Oropharynx: Previously, moist mucous membranes without exudate and a large tongue and class III-4 Mallampati airway, patient is wearing a facemask.     Results Review:  DME is [facility] and he uses a nasal mask.  Downloads between 12/4 and 1/26/2021 compliance greater than 95% average usage 6 hours and 9 minutes average AHI is normal but he has a leak of 129 minutes average auto CPAP pressure is 11.2 and his auto CPAP is 10-15.    Impression:   Obstructive sleep " apnea adequately treated with auto CPAP with good compliance and usage and no complaints of hypersomnolence since the patient takes armodafinil 250 mg every morning.    Plan:  Good sleep hygiene measures should be maintained.  Weight loss would be beneficial in this patient who is obese by BMI.  The patient is benefiting from the treatment being provided.     The patient will continue his new auto CPAP and will also continue armodafinil as prescribed    The patient will call for any problems and will follow up in 6 months.      Neil Lam MD  Sleep Medicine  12/03/20  11:11 EST

## 2021-10-20 ENCOUNTER — APPOINTMENT (OUTPATIENT)
Dept: SLEEP MEDICINE | Facility: HOSPITAL | Age: 51
End: 2021-10-20

## 2021-10-27 ENCOUNTER — OFFICE VISIT (OUTPATIENT)
Dept: SLEEP MEDICINE | Facility: HOSPITAL | Age: 51
End: 2021-10-27

## 2021-10-27 VITALS — BODY MASS INDEX: 34.79 KG/M2 | HEIGHT: 70 IN | WEIGHT: 243 LBS

## 2021-10-27 DIAGNOSIS — G47.33 OSA ON CPAP: ICD-10-CM

## 2021-10-27 DIAGNOSIS — G47.14 HYPERSOMNIA DUE TO MEDICAL CONDITION: ICD-10-CM

## 2021-10-27 DIAGNOSIS — Z99.89 OSA ON CPAP: ICD-10-CM

## 2021-10-27 PROCEDURE — G0463 HOSPITAL OUTPT CLINIC VISIT: HCPCS

## 2021-10-27 PROCEDURE — 99213 OFFICE O/P EST LOW 20 MIN: CPT | Performed by: INTERNAL MEDICINE

## 2021-10-27 RX ORDER — ARMODAFINIL 250 MG/1
TABLET ORAL
Qty: 90 TABLET | Refills: 1 | Status: SHIPPED | OUTPATIENT
Start: 2021-10-27 | End: 2022-10-13 | Stop reason: SDUPTHER

## 2021-10-27 NOTE — PROGRESS NOTES
"Follow Up [facility] Disorders Center Note     Chief Complaint:  ELENA     Primary Care Physician: Bushra Ruiz MD    Interval History:   The patient is a 51 y.o. male  who I last saw 12/3/2020 and that note was reviewed.  The patient reports he is stable without new complaints.  He goes to bed around midnight and awakens around 6 AM.  He will use the bathroom during the nighttime.    The patient continues to take armodafinil 250 mg daily, one half tab.    Self-administered [place] Sleepiness Scale test results: 3 previously 1  0-5 Lower normal daytime sleepiness  6-10 Higher normal daytime sleepiness  11-12 Mild, 13-15 Moderate, & 16-24 Severe excessive daytime sleepiness    Review of Systems:    A complete review of systems was done and all were negative with the exception of the above    Social History:    Social History     Socioeconomic History   • Marital status: Single   Tobacco Use   • Smoking status: Never Smoker   Substance and Sexual Activity   • Alcohol use: No   • Drug use: No   • Sexual activity: Yes       Allergies:  Patient has no known allergies.     Medication Review:  Reviewed.      Vital Signs:    Vitals:    10/27/21 1211   Weight: 110 kg (243 lb)   Height: 177.8 cm (70\")     Body mass index is 34.87 kg/m².    Physical Exam:    Constitutional:  Well developed 51 y.o. male that appears in no apparent distress.  Awake & oriented times 3.  Normal mood with normal recent and remote memory and normal judgement.  Eyes:  Conjunctivae normal.  Oropharynx: Previously, moist mucous membranes without exudate and a large tongue and class III-4 Mallampati airway, patient is wearing a facemask.     Downloaded PAP Data Reviewed For Compliance:  DME is [facility] and he uses a nasal mask.  Downloads between 7/21 and 10/18/2021 compliance 90%.  Average usage is 5 hours and 4 minutes.  Average AHI is normal with a leak of 1 hour and 38 minutes.  Average auto CPAP pressure is 11 and his auto CPAP is 10-15.    I have " reviewed the above results and compared them with the patient's last downloads and reviewed with the patient.    Impression:   Obstructive sleep apnea adequately treated with auto CPAP. The patient appears to be at goal with good compliance and usage. The patient has no complaints of hypersomnolence.    Plan:  Good sleep hygiene measures should be maintained.  Weight loss would be beneficial in this patient who is obese by BMI.      After evaluating the patient and assessing results available, the patient is benefiting from the treatment being provided.     The patient will continue auto CPAP.  After clinical evaluation and review of downloads, I recommend no changes to the patient's pressures.  A new prescription will be sent to the patient's DME.    Additionally, Torie recall discussed.  The patient is here with his mother.  He thinks his father has registered his machine with [employer].  He does not use an ozone .    The patient will continue armodafinil, 250 mg daily, one half tab with occasional 1 tab being used.  New prescription provided.  Edgard previously reviewed.    I answered all of the patient's questions.  The patient will call for any problems and will follow up in 8 months.      Neil Lam MD  Sleep Medicine  10/27/21  12:18 EDT

## 2021-12-29 ENCOUNTER — TELEPHONE (OUTPATIENT)
Dept: SLEEP MEDICINE | Facility: HOSPITAL | Age: 51
End: 2021-12-29

## 2021-12-29 NOTE — TELEPHONE ENCOUNTER
Patients' father called stating they never received supplies.  Contacted Christal at [facility] and she stated they would contact the patient today.

## 2022-04-08 ENCOUNTER — TELEPHONE (OUTPATIENT)
Dept: SLEEP MEDICINE | Facility: HOSPITAL | Age: 52
End: 2022-04-08

## 2022-04-08 NOTE — TELEPHONE ENCOUNTER
Received call from pts father for a new supply order and for a mask fitting. Message sent to Dr. Lam

## 2022-05-09 ENCOUNTER — OFFICE VISIT (OUTPATIENT)
Dept: FAMILY MEDICINE CLINIC | Facility: CLINIC | Age: 52
End: 2022-05-09

## 2022-05-09 VITALS
SYSTOLIC BLOOD PRESSURE: 122 MMHG | BODY MASS INDEX: 33.5 KG/M2 | DIASTOLIC BLOOD PRESSURE: 78 MMHG | OXYGEN SATURATION: 97 % | TEMPERATURE: 97.8 F | HEART RATE: 87 BPM | HEIGHT: 70 IN | WEIGHT: 234 LBS

## 2022-05-09 DIAGNOSIS — Z98.890 S/P SELECTIVE TRANSSPHENOIDAL PITUITARY ADENOMECTOMY: ICD-10-CM

## 2022-05-09 DIAGNOSIS — I15.2 HYPERTENSION DUE TO ENDOCRINE DISORDER: Primary | ICD-10-CM

## 2022-05-09 DIAGNOSIS — Z86.018 S/P SELECTIVE TRANSSPHENOIDAL PITUITARY ADENOMECTOMY: ICD-10-CM

## 2022-05-09 DIAGNOSIS — I63.9 CEREBROVASCULAR ACCIDENT (CVA), UNSPECIFIED MECHANISM: ICD-10-CM

## 2022-05-09 PROCEDURE — 99203 OFFICE O/P NEW LOW 30 MIN: CPT | Performed by: FAMILY MEDICINE

## 2022-05-09 RX ORDER — AMLODIPINE BESYLATE 10 MG/1
10 TABLET ORAL DAILY
Qty: 90 TABLET | Refills: 1 | Status: SHIPPED | OUTPATIENT
Start: 2022-05-09

## 2022-05-09 RX ORDER — LOSARTAN POTASSIUM AND HYDROCHLOROTHIAZIDE 25; 100 MG/1; MG/1
1 TABLET ORAL DAILY
Qty: 90 TABLET | Refills: 1 | Status: SHIPPED | OUTPATIENT
Start: 2022-05-09

## 2022-05-09 NOTE — PROGRESS NOTES
"Chief Complaint  Hypertension (No complain doing well ,no complains about tinnitus or chest pain  )    Subjective          Bernard Cruz  presents to [facility]  History of Present Illness  Pleasant 51-year-old male lost to follow-up since February 2018, here to follow-up hypertension and some concerns that his mom has for hearing.  Initially we conducted the visit with the patient independently, however he was not sure about his medications, therefore his mom was brought in to review his current med list and his father was called on the phone as well.      He unfortunately does have a complex past history of stroke in 2012, known hypertension that is well controlled-it was confirmed that he is still taking amlodipine 10 mg and losartan hydrochlorothiazide 100-25 mg daily.  No adverse effects to medication and asymptomatic.    He also has had a history of pulmonary embolism and DVT that was provoked after surgery for pituitary adenoma.  As it was provoked no chronic anticoagulation.  IVC filter placed in 2012.  He does have known sleep apnea well-controlled on CPAP, GERD, diabetes followed by endocrinology, seizures followed by neurology and hyperlipidemia controlled on statin.    He does see his endocrinologist regularly, neurologist regularly, pulmonologist    Objective   Vital Signs:  /78   Pulse 87   Temp 97.8 °F (36.6 °C)   Ht 177.8 cm (70\")   Wt 106 kg (234 lb)   SpO2 97%   BMI 33.58 kg/m²           Physical Exam  Vitals and nursing note reviewed.   Constitutional:       General: He is not in acute distress.     Appearance: He is well-developed.   HENT:      Head: Normocephalic.      Nose: Nose normal.   Cardiovascular:      Rate and Rhythm: Normal rate and regular rhythm.      Heart sounds: Normal heart sounds. No murmur heard.  Pulmonary:      Effort: Pulmonary effort is normal. No respiratory distress.      Breath sounds: Normal breath sounds.   Musculoskeletal: "         General: Normal range of motion.   Skin:     General: Skin is warm and dry.      Findings: No rash.   Neurological:      Mental Status: He is alert and oriented to person, place, and time.   Psychiatric:         Behavior: Behavior normal.         Thought Content: Thought content normal.         Judgment: Judgment normal.        Result Review :                 Assessment and Plan    Diagnoses and all orders for this visit:    1. 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  Dispense: 90 tablet; Refill: 1  -     amLODIPine (NORVASC) 10 MG tablet; Take 1 tablet by mouth Daily.  Dispense: 90 tablet; Refill: 1    2. S/P selective transsphenoidal pituitary adenomectomy    3. Cerebrovascular accident (CVA), unspecified mechanism (HCC)    Pleasant 51-year-old male here lost to follow-up, he has a very complex history see above.  It does appear that he is currently taking the above meds for hypertension.  We discussed that he does need to be seen every 6 months for regular blood pressure follow-up.  He also had labs performed with his endocrinologist.  We will request labs which were performed last month, not available in electronic medical records.  I do wonder if there is a greater component of cognitive impairment especially as he is not able to organize his own medical care/medication and is currently living with his parents.  These are conversations we can continue to have moving forward but do wonder about some of his independence moving forward.    From a hearing standpoint, normal exam, advised hearing testing potentially at [facility] or [facility].       Follow Up   Return in 6 months (on 11/9/2022), or if symptoms worsen or fail to improve, for Annual Exam with fasting labs prior.  Patient was given instructions and counseling regarding his condition or for health maintenance advice. Please see specific  information pulled into the AVS if appropriate. Medical assistant and I wore mask and eyewear protection during entire encounter.  Patient wore mask.    Bushra Ruiz MD

## 2022-06-29 ENCOUNTER — APPOINTMENT (OUTPATIENT)
Dept: SLEEP MEDICINE | Facility: HOSPITAL | Age: 52
End: 2022-06-29

## 2022-08-25 ENCOUNTER — OFFICE VISIT (OUTPATIENT)
Dept: SLEEP MEDICINE | Facility: HOSPITAL | Age: 52
End: 2022-08-25

## 2022-08-25 VITALS — BODY MASS INDEX: 33.07 KG/M2 | WEIGHT: 231 LBS | HEART RATE: 102 BPM | OXYGEN SATURATION: 97 % | HEIGHT: 70 IN

## 2022-08-25 DIAGNOSIS — Z99.89 OSA ON CPAP: Primary | ICD-10-CM

## 2022-08-25 DIAGNOSIS — G47.33 OSA ON CPAP: Primary | ICD-10-CM

## 2022-08-25 DIAGNOSIS — G47.14 HYPERSOMNIA DUE TO MEDICAL CONDITION: ICD-10-CM

## 2022-08-25 PROCEDURE — G0463 HOSPITAL OUTPT CLINIC VISIT: HCPCS

## 2022-08-25 PROCEDURE — 99213 OFFICE O/P EST LOW 20 MIN: CPT | Performed by: INTERNAL MEDICINE

## 2022-08-25 NOTE — PROGRESS NOTES
"Follow Up Sleep Disorders Center Note     Chief Complaint:  ELENA     Primary Care Physician: Bushra Ruiz MD    Interval History:   The patient is a 52 y.o. male  who I last saw 10/27/2021 and that note was reviewed.  The patient is here by himself.  He reports no new problems.  He goes to bed at 9 PM and gets out of bed at 10 AM.  He will use the restroom during that time.    The patient continues to take Nuvigil, 250 mg, one half tab daily    Review of Systems:    A complete review of systems was done and all were negative with the exception of the above    Social History:    Social History     Socioeconomic History   • Marital status: Single   Tobacco Use   • Smoking status: Never Smoker   Substance and Sexual Activity   • Alcohol use: No   • Drug use: No   • Sexual activity: Yes       Allergies:  Patient has no known allergies.     Medication Review:  Reviewed.      Vital Signs:    Vitals:    08/25/22 1100   Pulse: 102   SpO2: 97%   Weight: 105 kg (231 lb)   Height: 177.8 cm (70\")     Body mass index is 33.15 kg/m².    Physical Exam:    Constitutional:  Well developed 52 y.o. male that appears in no apparent distress.  Awake & oriented times 3.  Normal mood with normal recent and remote memory and normal judgement.  Eyes:  Conjunctivae normal.  Oropharynx: Previously, moist mucous membranes without exudate and a large tongue and class III-4 Mallampati airway, patient is wearing a facemask.    Self-administered [place] Sleepiness Scale test results: 8  0-5 Lower normal daytime sleepiness  6-10 Higher normal daytime sleepiness  11-12 Mild, 13-15 Moderate, & 16-24 Severe excessive daytime sleepiness     Downloaded PAP Data Reviewed For Compliance:  DME is [facility] and he uses a fullface mask.  Downloads between 5/13 and 8/10/2022 compliance 100%.  Average usage is 5 hours and 44 minutes.  Average AHI is normal with an average leak of 3 hours and 28 minutes.  Average auto CPAP pressure is 10.5 and his auto CPAP is " 10-15    I have reviewed the above results and compared them with the patient's last downloads and reviewed with the patient.    Impression:   Obstructive sleep apnea adequately treated with auto CPAP. The patient appears to be at goal with good compliance and usage. The patient has no complaints of hypersomnolence.    Plan:  Good sleep hygiene measures should be maintained.  Weight loss would be beneficial in this patient who is obese by BMI.      After evaluating the patient and assessing results available, the patient is benefiting from the treatment being provided.     The patient will continue auto CPAP.  After clinical evaluation and review of downloads, I recommend no changes to the patient's pressures.  A new prescription will be sent to the patient's DME.    The patient is to make sure that his device is registered related to the Torie recall    The patient will continue armodafinil, 250 mg one half tab daily.  Edgard reviewed and there are no irregularities.    I answered all of the patient's questions.  The patient will call for any problems and will follow up in 9 months.      Neil Lam MD  Sleep Medicine  08/25/22  11:55 EDT

## 2022-10-13 DIAGNOSIS — G47.33 OSA ON CPAP: ICD-10-CM

## 2022-10-13 DIAGNOSIS — Z99.89 OSA ON CPAP: ICD-10-CM

## 2022-10-13 DIAGNOSIS — G47.14 HYPERSOMNIA DUE TO MEDICAL CONDITION: ICD-10-CM

## 2022-10-13 RX ORDER — ARMODAFINIL 250 MG/1
TABLET ORAL
Qty: 90 TABLET | Refills: 1 | Status: SHIPPED | OUTPATIENT
Start: 2022-10-13 | End: 2023-01-24 | Stop reason: SDUPTHER

## 2022-10-27 ENCOUNTER — FLU SHOT (OUTPATIENT)
Dept: FAMILY MEDICINE CLINIC | Facility: CLINIC | Age: 52
End: 2022-10-27

## 2022-10-27 DIAGNOSIS — Z23 NEED FOR VACCINATION: Primary | ICD-10-CM

## 2022-10-27 PROCEDURE — G0008 ADMIN INFLUENZA VIRUS VAC: HCPCS | Performed by: FAMILY MEDICINE

## 2022-10-27 PROCEDURE — 90688 IIV4 VACCINE SPLT 0.5 ML IM: CPT | Performed by: FAMILY MEDICINE

## 2022-11-29 ENCOUNTER — OFFICE VISIT (OUTPATIENT)
Dept: FAMILY MEDICINE CLINIC | Facility: CLINIC | Age: 52
End: 2022-11-29

## 2022-11-29 ENCOUNTER — TELEPHONE (OUTPATIENT)
Dept: SLEEP MEDICINE | Facility: HOSPITAL | Age: 52
End: 2022-11-29

## 2022-11-29 VITALS
SYSTOLIC BLOOD PRESSURE: 100 MMHG | HEART RATE: 89 BPM | OXYGEN SATURATION: 97 % | BODY MASS INDEX: 33.79 KG/M2 | TEMPERATURE: 97.3 F | HEIGHT: 70 IN | DIASTOLIC BLOOD PRESSURE: 70 MMHG | WEIGHT: 236 LBS

## 2022-11-29 DIAGNOSIS — Z95.828 S/P IVC FILTER: ICD-10-CM

## 2022-11-29 DIAGNOSIS — I82.4Y1 DEEP VEIN THROMBOSIS (DVT) OF PROXIMAL VEIN OF RIGHT LOWER EXTREMITY, UNSPECIFIED CHRONICITY: Primary | ICD-10-CM

## 2022-11-29 DIAGNOSIS — I26.99 OTHER ACUTE PULMONARY EMBOLISM, UNSPECIFIED WHETHER ACUTE COR PULMONALE PRESENT: ICD-10-CM

## 2022-11-29 PROCEDURE — 99215 OFFICE O/P EST HI 40 MIN: CPT | Performed by: STUDENT IN AN ORGANIZED HEALTH CARE EDUCATION/TRAINING PROGRAM

## 2022-11-29 RX ORDER — DIPHENOXYLATE HYDROCHLORIDE AND ATROPINE SULFATE 2.5; .025 MG/1; MG/1
1 TABLET ORAL DAILY
COMMUNITY

## 2022-11-29 RX ORDER — SIMVASTATIN 10 MG
10 TABLET ORAL DAILY
COMMUNITY
End: 2023-01-12 | Stop reason: SDUPTHER

## 2022-11-29 RX ORDER — APIXABAN 5 MG (74)
1 KIT ORAL
COMMUNITY
Start: 2022-11-25 | End: 2022-12-21

## 2022-11-29 RX ORDER — LEVOTHYROXINE SODIUM 0.05 MG/1
50 TABLET ORAL DAILY
COMMUNITY
End: 2022-11-29 | Stop reason: SDUPTHER

## 2022-11-29 RX ORDER — TESTOSTERONE 16.2 MG/G
GEL TRANSDERMAL
COMMUNITY

## 2022-11-29 RX ORDER — AMLODIPINE BESYLATE 10 MG/1
10 TABLET ORAL DAILY
COMMUNITY
End: 2022-11-30

## 2022-11-29 RX ORDER — LEVETIRACETAM 500 MG/1
500 TABLET ORAL
COMMUNITY
Start: 2022-10-18

## 2022-11-29 NOTE — PROGRESS NOTES
"Chief Complaint  Transitional Care Management (Pt was hospitalized from Wednesday to Saturday last week /-blood clots in both lungs and both legs (still in, started blood thinners)/-pt is fatigued and in pain on r leg )    Subjective        Bernard Cruz Jr. presents to [facility]  History of Present Illness  52yoM who presents for follow up after hospitalization for PE.    Bernard was admitted to [facility] from 11/23/2022- 11/25/2022 for  PE. He presented with 3-day history of shortness of breath and right leg pain.  He has a history of DVT and PE that occurred after a brain surgery 5 years ago.  He was on anticoagulation initially but then IVC filter was placed.    CT PE showed bilateral pulmonary emboli with right heart strain.  Bilateral duplex shows evidence of acute DVT in the mid femoral vein, distal femoral vein, popliteal vein, gastroc vein, posterior tibial and peroneal veins of right lower extremity. patient was started on heparin in the ED.  Cardiology was consulted.  Catheter intervention not pursued due to IVC filter and hemodynamic stability.  Patient was not requiring oxygen.  He was transitioned from heparin to apixaban on discharge.      Objective   Vital Signs:  /70 (BP Location: Right arm, Patient Position: Sitting, Cuff Size: Adult)   Pulse 89   Temp 97.3 °F (36.3 °C) (Infrared)   Ht 177.8 cm (70\")   Wt 107 kg (236 lb)   SpO2 97%   BMI 33.86 kg/m²   Estimated body mass index is 33.86 kg/m² as calculated from the following:    Height as of this encounter: 177.8 cm (70\").    Weight as of this encounter: 107 kg (236 lb).      Physical Exam  Constitutional:       General: He is not in acute distress.  Eyes:      Conjunctiva/sclera: Conjunctivae normal.   Cardiovascular:      Rate and Rhythm: Normal rate and regular rhythm.   Pulmonary:      Effort: Pulmonary effort is normal. No respiratory distress.      Breath sounds: Normal breath sounds. "   Musculoskeletal:         General: No tenderness.      Right lower leg: Edema present.      Left lower leg: No edema.   Skin:     General: Skin is warm and dry.   Neurological:      Mental Status: He is alert and oriented to person, place, and time. Mental status is at baseline.   Psychiatric:         Mood and Affect: Mood normal.         Behavior: Behavior normal.        Result Review :  The following data was reviewed by: Stephanie Calhoun MD on 11/29/2022:  Common labs    Common Labs 11/24/22 11/25/22   WBC  10.57   Hemoglobin  12.2 (A)   Hematocrit  37.9 (A)   Platelets  236   Hemoglobin A1C 5.8 (A)    (A) Abnormal value       Comments are available for some flowsheets but are not being displayed.           Data reviewed: summarized in HPI          Assessment and Plan   Diagnoses and all orders for this visit:    1. Deep vein thrombosis (DVT) of proximal vein of right lower extremity, unspecified chronicity (HCC) (Primary)    2. Other acute pulmonary embolism, unspecified whether acute cor pulmonale present (HCC)    3. S/P IVC filter    Doing well post-discharge. Hemodynamically stable today. Describes some shortness of breath and right calf soreness. He is ambulating without difficulty. spO2 97% on RA. He has been taking apixiban as directed. 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 He had an IVC filter placed in 2012 following DVT that resulted after neurosurgery. Given it was placed 10yrs ago, high risk of complications with removal of IVC filter.     Echo while inpatient with EF 53%, RV moderately/severely enlarged with moderate global hypokinesis. Flattening of interventricular septum during systole. May be a component of chronic RV pressure elevation (due to acute on chronic PE or acute PE with h/o ELENA). RVSP 41mmHg. He would likely benefit from repeat Echo in 3-6months.     Discussed compression socks for RLE to help with edema and pain.        I spent 40 minutes caring for Bernard  on this date of service. This time includes time spent by me in the following activities:reviewing tests, performing a medically appropriate examination and/or evaluation , counseling and educating the patient/family/caregiver and documenting information in the medical record  Follow Up   No follow-ups on file.  Patient was given instructions and counseling regarding his condition or for health maintenance advice. Please see specific information pulled into the AVS if appropriate.

## 2022-12-02 ENCOUNTER — TELEPHONE (OUTPATIENT)
Dept: SLEEP MEDICINE | Facility: HOSPITAL | Age: 52
End: 2022-12-02

## 2022-12-21 NOTE — TELEPHONE ENCOUNTER
You evaluated pt on 11/29 post hospital stay. Please advise if pt is to continue Eliquis treatment and authorize refill if appropriate.

## 2022-12-21 NOTE — TELEPHONE ENCOUNTER
Caller: Bernard Cruz Sr    Relationship: Father    Best call back number: [phone]    Requested Prescriptions:   Requested Prescriptions     Pending Prescriptions Disp Refills   • apixaban (ELIQUIS) 5 MG tablet tablet 60 tablet      Sig: Take 1 tablet by mouth.        Pharmacy where request should be sent: [facility] - [address] [phone] [facility] [phone] FX     Additional details provided by patient: PATIENT'S FATHER STATES THAT PATIENT WAS PRESCRIBED THE MEDICATION WHILE AT [facility] WHEN HE HAD BLOOD CLOT AND IS REQUESTING A REFILL BE SENT IN    Does the patient have less than a 3 day supply:  [] Yes  [] No    Would you like a call back once the refill request has been completed: [x] Yes [] No    If the office needs to give you a call back, can they leave a voicemail: [x] Yes [] No    Yolis Gray Rep   12/21/22 10:16 EST

## 2023-01-03 DIAGNOSIS — Z00.00 HEALTHCARE MAINTENANCE: ICD-10-CM

## 2023-01-03 DIAGNOSIS — Z86.39 HISTORY OF HYPOTHYROIDISM: ICD-10-CM

## 2023-01-03 DIAGNOSIS — I15.2 HYPERTENSION DUE TO ENDOCRINE DISORDER: Primary | ICD-10-CM

## 2023-01-03 DIAGNOSIS — Z13.220 LIPID SCREENING: ICD-10-CM

## 2023-01-03 DIAGNOSIS — I82.4Y1 DEEP VEIN THROMBOSIS (DVT) OF PROXIMAL VEIN OF RIGHT LOWER EXTREMITY, UNSPECIFIED CHRONICITY: ICD-10-CM

## 2023-01-03 DIAGNOSIS — I63.9 CEREBROVASCULAR ACCIDENT (CVA), UNSPECIFIED MECHANISM: ICD-10-CM

## 2023-01-03 DIAGNOSIS — Z12.5 SCREENING FOR PROSTATE CANCER: ICD-10-CM

## 2023-01-03 DIAGNOSIS — Z13.29 SCREENING FOR THYROID DISORDER: ICD-10-CM

## 2023-01-03 DIAGNOSIS — I26.99 OTHER ACUTE PULMONARY EMBOLISM, UNSPECIFIED WHETHER ACUTE COR PULMONALE PRESENT: ICD-10-CM

## 2023-01-05 LAB
ALBUMIN SERPL-MCNC: 4 G/DL (ref 3.5–5.2)
ALBUMIN/CREAT UR: 4 MG/G CREAT (ref 0–29)
ALBUMIN/GLOB SERPL: 1.5 G/DL
ALP SERPL-CCNC: 61 U/L (ref 39–117)
ALT SERPL-CCNC: 7 U/L (ref 1–41)
AST SERPL-CCNC: 17 U/L (ref 1–40)
BASOPHILS # BLD AUTO: 0.06 10*3/MM3 (ref 0–0.2)
BASOPHILS NFR BLD AUTO: 0.6 % (ref 0–1.5)
BILIRUB SERPL-MCNC: 0.3 MG/DL (ref 0–1.2)
BUN SERPL-MCNC: 11 MG/DL (ref 6–20)
BUN/CREAT SERPL: 9.7 (ref 7–25)
CALCIUM SERPL-MCNC: 9.1 MG/DL (ref 8.6–10.5)
CHLORIDE SERPL-SCNC: 106 MMOL/L (ref 98–107)
CHOLEST SERPL-MCNC: 179 MG/DL (ref 0–200)
CO2 SERPL-SCNC: 27.3 MMOL/L (ref 22–29)
CREAT SERPL-MCNC: 1.13 MG/DL (ref 0.76–1.27)
CREAT UR-MCNC: 229.2 MG/DL
EGFRCR SERPLBLD CKD-EPI 2021: 77.2 ML/MIN/1.73
EOSINOPHIL # BLD AUTO: 0.28 10*3/MM3 (ref 0–0.4)
EOSINOPHIL NFR BLD AUTO: 3 % (ref 0.3–6.2)
ERYTHROCYTE [DISTWIDTH] IN BLOOD BY AUTOMATED COUNT: 14.6 % (ref 12.3–15.4)
GLOBULIN SER CALC-MCNC: 2.7 GM/DL
GLUCOSE SERPL-MCNC: 116 MG/DL (ref 65–99)
HCT VFR BLD AUTO: 39.6 % (ref 37.5–51)
HDLC SERPL-MCNC: 53 MG/DL (ref 40–60)
HGB BLD-MCNC: 13 G/DL (ref 13–17.7)
IMM GRANULOCYTES # BLD AUTO: 0.09 10*3/MM3 (ref 0–0.05)
IMM GRANULOCYTES NFR BLD AUTO: 1 % (ref 0–0.5)
LDLC SERPL CALC-MCNC: 111 MG/DL (ref 0–100)
LDLC/HDLC SERPL: 2.08 {RATIO}
LYMPHOCYTES # BLD AUTO: 2.65 10*3/MM3 (ref 0.7–3.1)
LYMPHOCYTES NFR BLD AUTO: 28.2 % (ref 19.6–45.3)
MCH RBC QN AUTO: 29.8 PG (ref 26.6–33)
MCHC RBC AUTO-ENTMCNC: 32.8 G/DL (ref 31.5–35.7)
MCV RBC AUTO: 90.8 FL (ref 79–97)
MICROALBUMIN UR-MCNC: 8.5 UG/ML
MONOCYTES # BLD AUTO: 0.53 10*3/MM3 (ref 0.1–0.9)
MONOCYTES NFR BLD AUTO: 5.6 % (ref 5–12)
NEUTROPHILS # BLD AUTO: 5.79 10*3/MM3 (ref 1.7–7)
NEUTROPHILS NFR BLD AUTO: 61.6 % (ref 42.7–76)
NRBC BLD AUTO-RTO: 0.1 /100 WBC (ref 0–0.2)
PLATELET # BLD AUTO: 312 10*3/MM3 (ref 140–450)
POTASSIUM SERPL-SCNC: 3.9 MMOL/L (ref 3.5–5.2)
PROT SERPL-MCNC: 6.7 G/DL (ref 6–8.5)
PSA SERPL-MCNC: 0.78 NG/ML (ref 0–4)
RBC # BLD AUTO: 4.36 10*6/MM3 (ref 4.14–5.8)
SODIUM SERPL-SCNC: 144 MMOL/L (ref 136–145)
TRIGL SERPL-MCNC: 78 MG/DL (ref 0–150)
TSH SERPL DL<=0.005 MIU/L-ACNC: 1.03 UIU/ML (ref 0.27–4.2)
VLDLC SERPL CALC-MCNC: 15 MG/DL (ref 5–40)
WBC # BLD AUTO: 9.4 10*3/MM3 (ref 3.4–10.8)

## 2023-01-09 ENCOUNTER — OFFICE VISIT (OUTPATIENT)
Dept: FAMILY MEDICINE CLINIC | Facility: CLINIC | Age: 53
End: 2023-01-09
Payer: MEDICARE

## 2023-01-09 VITALS
SYSTOLIC BLOOD PRESSURE: 116 MMHG | HEIGHT: 70 IN | HEART RATE: 84 BPM | TEMPERATURE: 97.8 F | BODY MASS INDEX: 33.79 KG/M2 | WEIGHT: 236 LBS | DIASTOLIC BLOOD PRESSURE: 80 MMHG | OXYGEN SATURATION: 99 %

## 2023-01-09 DIAGNOSIS — Z12.11 SCREEN FOR COLON CANCER: ICD-10-CM

## 2023-01-09 DIAGNOSIS — Z23 NEED FOR VACCINATION: ICD-10-CM

## 2023-01-09 DIAGNOSIS — I82.4Y1 DEEP VEIN THROMBOSIS (DVT) OF PROXIMAL VEIN OF RIGHT LOWER EXTREMITY, UNSPECIFIED CHRONICITY: ICD-10-CM

## 2023-01-09 DIAGNOSIS — Z00.00 MEDICARE ANNUAL WELLNESS VISIT, SUBSEQUENT: Primary | ICD-10-CM

## 2023-01-09 PROCEDURE — 90677 PCV20 VACCINE IM: CPT | Performed by: FAMILY MEDICINE

## 2023-01-09 PROCEDURE — G0009 ADMIN PNEUMOCOCCAL VACCINE: HCPCS | Performed by: FAMILY MEDICINE

## 2023-01-09 NOTE — PROGRESS NOTES
Chief Complaint  Annual Exam    Subjective    {Problem List  Visit Diagnosis   Encounters  Notes  Medications  Labs  Result Review Imaging  Media :23}    Bernard Cruz Jr. presents to [facility] PRIMARY [facility]  History of Present Illness  Annual Exam.    Health Maintenance   Topic Date Due   • COLORECTAL CANCER SCREENING  Never done   • TDAP/TD VACCINES (1 - Tdap) Never done   • HEPATITIS C SCREENING  Never done   • ANNUAL WELLNESS VISIT  Never done   • ZOSTER VACCINE (1 of 2) Never done   • COVID-19 Vaccine (3 - Booster for Pfizer series) 04/26/2022   • DIABETIC FOOT EXAM  01/24/2024 (Originally 12/8/2016)   • DIABETIC EYE EXAM  01/12/2023   • HEMOGLOBIN A1C  05/24/2023   • LIPID PANEL  01/04/2024   • URINE MICROALBUMIN  01/04/2024   • Pneumococcal Vaccine 0-64  Completed   • INFLUENZA VACCINE  Completed   • Hepatitis B  Discontinued     Objective   Vital Signs:  /80   Pulse 84   Temp 97.8 °F (36.6 °C)   Ht 177.8 cm (70\")   Wt 107 kg (236 lb)   SpO2 99%   BMI 33.86 kg/m²   Estimated body mass index is 33.86 kg/m² as calculated from the following:    Height as of this encounter: 177.8 cm (70\").    Weight as of this encounter: 107 kg (236 lb).    {BMI is >= 30 and <35. (Class 1 Obesity). The following options were offered after discussion; (Optional):69346}      Physical Exam  Vitals reviewed.   Constitutional:       General: He is not in acute distress.     Appearance: Normal appearance. He is well-developed. He is not diaphoretic.   HENT:      Head: Normocephalic and atraumatic. Hair is normal.      Right Ear: Hearing, tympanic membrane, ear canal and external ear normal.      Left Ear: Hearing, tympanic membrane, ear canal and external ear normal.      Nose: Nose normal. No nasal deformity.      Mouth/Throat:      Mouth: Mucous membranes are moist. No oral lesions.      Pharynx: Uvula midline. No uvula swelling.   Eyes:      General: Lids are normal. No scleral icterus.         Right eye: No discharge.         Left eye: No discharge.      Extraocular Movements: Extraocular movements intact.      Right eye: Normal extraocular motion and no nystagmus.      Left eye: Normal extraocular motion and no nystagmus.      Conjunctiva/sclera: Conjunctivae normal.      Pupils: Pupils are equal, round, and reactive to light.   Neck:      Thyroid: No thyromegaly.      Vascular: No JVD.   Cardiovascular:      Rate and Rhythm: Normal rate and regular rhythm.      Pulses: Normal pulses.      Heart sounds: Normal heart sounds. No murmur heard.    No gallop.   Pulmonary:      Effort: Pulmonary effort is normal. No respiratory distress.      Breath sounds: Normal breath sounds. No wheezing or rales.   Chest:      Chest wall: No tenderness.   Abdominal:      General: Bowel sounds are normal. There is no distension.      Palpations: Abdomen is soft. There is no mass.      Tenderness: There is no abdominal tenderness. There is no guarding.      Hernia: No hernia is present.   Musculoskeletal:         General: No tenderness or deformity. Normal range of motion.      Cervical back: Normal range of motion and neck supple.   Lymphadenopathy:      Cervical: No cervical adenopathy.   Skin:     General: Skin is warm and dry.      Findings: No rash.   Neurological:      Mental Status: He is alert and oriented to person, place, and time.      Cranial Nerves: No cranial nerve deficit.      Motor: No abnormal muscle tone.      Coordination: Coordination normal.      Deep Tendon Reflexes: Reflexes are normal and symmetric. Reflexes normal.   Psychiatric:         Mood and Affect: Mood normal.         Behavior: Behavior normal.         Thought Content: Thought content normal.         Judgment: Judgment normal.        Result Review :{Labs  Result Review  Imaging  Med Tab  Media  Procedures  :23}  The following data was reviewed by: Bushra Ruiz MD on 01/09/2023:  CMP    CMP 1/4/23   Glucose 116 (A)   BUN 11   Creatinine  1.13   Sodium 144   Potassium 3.9   Chloride 106   Calcium 9.1   Total Protein 6.7   Albumin 4.0   Globulin 2.7   Total Bilirubin 0.3   Alkaline Phosphatase 61   AST (SGOT) 17   ALT (SGPT) 7   BUN/Creatinine Ratio 9.7   (A) Abnormal value            CBC    CBC 11/25/22 1/4/23   WBC 10.57 9.40   RBC 4.15 (A) 4.36   Hemoglobin 12.2 (A) 13.0   Hematocrit 37.9 (A) 39.6   MCV 91.3 90.8   MCH 29.4 29.8   MCHC 32.2 32.8   RDW 14.8 14.6   Platelets 236 312   (A) Abnormal value            Lipid Panel    Lipid Panel 1/4/23   Total Cholesterol 179   Triglycerides 78   HDL Cholesterol 53   VLDL Cholesterol 15   LDL Cholesterol  111 (A)   LDL/HDL Ratio 2.08   (A) Abnormal value       Comments are available for some flowsheets but are not being displayed.           TSH    TSH 1/4/23   TSH 1.030           {Data reviewed (Optional):71165:::1}          Assessment and Plan {CC Problem List  Visit Diagnosis   ROS  Review (Popup)  Health Maintenance  Quality  BestPractice  Medications  SmartSets  SnapShot Encounters  Media :23}  Diagnoses and all orders for this visit:    1. Health maintenance examination (Primary)    2. Screen for colon cancer  -     Cologuard - Stool, Per Rectum; Future    3. Deep vein thrombosis (DVT) of proximal vein of right lower extremity, unspecified chronicity (HCC)  -     Comprehensive Metabolic Panel; Future  -     CBC & Differential; Future    4. Need for vaccination  -     Pneumococcal Conjugate Vaccine 20-Valent All    Here for annual exam, fasting labs reviewed with patient as well as father, immunizations up-to-date, colon cancer screening ordered as above, cardiovascular screening negative for symptoms, counseled patient to increase vegetable intake, water and 150 minutes of exercise weekly combining weightbearing exercises and aerobic activity.    Discussed Eliquis with second DVT, thankfully he is tolerating well no adverse effects of bleeding.  We will follow-up in 6 months, plan to get  CMP and CBC prior.     {Time Spent (Optional):45028}  Follow Up {Instructions Charge Capture  Follow-up Communications :23}  Return in about 6 months (around 7/9/2023), or if symptoms worsen or fail to improve, for Recheck DVT with labs prior .  Patient was given instructions and counseling regarding his condition or for health maintenance advice. Please see specific information pulled into the AVS if appropriate. Medical assistant and I wore mask and eyewear protection during entire encounter.  Patient wore mask.    Bushra Ruiz MD

## 2023-01-10 NOTE — PROGRESS NOTES
The ABCs of the Annual Wellness Visit  Subsequent Medicare Wellness Visit    Subjective      Bernard Cruz Jr. is a 52 y.o. male who presents for a Subsequent Medicare Wellness Visit.    The following portions of the patient's history were reviewed and   updated as appropriate: allergies, current medications, past family history, past medical history, past social history, past surgical history and problem list.    Compared to one year ago, the patient feels his physical   health is the same.    Compared to one year ago, the patient feels his mental   health is the same.    Recent Hospitalizations:  He was admitted within the past 365 days at [facility].       Current Medical Providers:  Patient Care Team:  Bushra Ruiz MD as PCP - General (Family Medicine)  Neil Lam MD as Consulting Physician (Sleep Medicine)  Izaiah Nino MD as Consulting Physician (Neurosurgery)    Outpatient Medications Prior to Visit   Medication Sig Dispense Refill   • apixaban (ELIQUIS) 5 MG tablet tablet Take 1 tablet by mouth Every 12 (Twelve) Hours. 180 tablet 0   • Armodafinil 250 MG tablet Take 1/2 or 1 tab PO daily 90 tablet 1   • hydrocortisone (CORTEF) 10 MG tablet Take 10 mg by mouth Daily.     • levETIRAcetam (KEPPRA) 500 MG tablet Take 500 mg by mouth.     • levothyroxine (SYNTHROID, LEVOTHROID) 50 MCG tablet Take  by mouth Daily.     • losartan-hydrochlorothiazide (HYZAAR) 100-25 MG per tablet Take 1 tablet by mouth Daily. 90 tablet 1   • multivitamin (THERAGRAN) tablet tablet Take 1 tablet by mouth Daily.     • simvastatin (ZOCOR) 10 MG tablet Take 10 mg by mouth Daily.     • Testosterone 20.25 MG/ACT (1.62%) gel Place  on the skin as directed by provider.     • amLODIPine (NORVASC) 10 MG tablet Take 1 tablet by mouth Daily. 90 tablet 1     No facility-administered medications prior to visit.       No opioid medication identified on active medication list. I have reviewed chart for other potential  high risk  medication/s and harmful drug interactions in the elderly.          Aspirin is not on active medication list.  Aspirin use is contraindicated for this patient due to: current use of Eliquis.  .    Patient Active Problem List   Diagnosis   • ELENA on autoCPAP   • Hypersomnolence treated with Nuvigil 250 mg one half tab daily   • Pituitary adenoma (HCC)   • Cerebrovascular accident (HCC)   • DVT (deep venous thrombosis) (HCC)   • Dyslipidemia   • Serum creatinine raised   • Gastroesophageal reflux disease   • HTN (hypertension)   • Encephalocystocele (HCC)   • Legal blindness   • Pulmonary embolism with infarction (HCC)   • S/P selective transsphenoidal pituitary adenomectomy   • Seizure (HCC)   • Vitamin D deficiency   • S/P IVC filter   • Acute pulmonary embolism (HCC)     Advance Care Planning  Advance Directive is not on file.  ACP discussion was held with the patient during this visit. Patient has an advance directive (not in EMR), copy requested.     Objective    Vitals:    01/09/23 1259   BP: 116/80   Pulse: 84   Temp: 97.8 °F (36.6 °C)   SpO2: 99%   Weight: 107 kg (236 lb)   Height: 177.8 cm (70\")   PainSc: 0-No pain     Estimated body mass index is 33.86 kg/m² as calculated from the following:    Height as of this encounter: 177.8 cm (70\").    Weight as of this encounter: 107 kg (236 lb).    BMI is >= 30 and <35. (Class 1 Obesity). The following options were offered after discussion;: exercise counseling/recommendations and nutrition counseling/recommendations      Does the patient have evidence of cognitive impairment?   No    Lab Results   Component Value Date    CHLPL 179 01/04/2023    TRIG 78 01/04/2023    HDL 53 01/04/2023     (H) 01/04/2023    VLDL 15 01/04/2023    HGBA1C 5.8 (H) 11/24/2022          HEALTH RISK ASSESSMENT    Smoking Status:  Social History     Tobacco Use   Smoking Status Never   • Passive exposure: Never   Smokeless Tobacco Never     Alcohol Consumption:  Social History      Substance and Sexual Activity   Alcohol Use No     Fall Risk Screen:    STEADI Fall Risk Assessment has not been completed.    Depression Screening:  PHQ-2/PHQ-9 Depression Screening 11/29/2022   Little Interest or Pleasure in Doing Things 0-->not at all   Feeling Down, Depressed or Hopeless 0-->not at all   PHQ-9: Brief Depression Severity Measure Score 0       Health Habits and Functional and Cognitive Screening:  No flowsheet data found.    Age-appropriate Screening Schedule:  Refer to the list below for future screening recommendations based on patient's age, sex and/or medical conditions. Orders for these recommended tests are listed in the plan section. The patient has been provided with a written plan.    Health Maintenance   Topic Date Due   • TDAP/TD VACCINES (1 - Tdap) Never done   • ZOSTER VACCINE (1 of 2) Never done   • DIABETIC FOOT EXAM  01/24/2024 (Originally 12/8/2016)   • DIABETIC EYE EXAM  01/12/2023   • HEMOGLOBIN A1C  05/24/2023   • LIPID PANEL  01/04/2024   • URINE MICROALBUMIN  01/04/2024   • INFLUENZA VACCINE  Completed                CMS Preventative Services Quick Reference  Risk Factors Identified During Encounter:    Fall Risk-High or Moderate: Discussed Fall Prevention in the home  Immunizations Discussed/Encouraged: Tdap  Inactivity/Sedentary: Patient was advised to exercise at least 150 minutes a week per CDC recommendations.  Vision Screening Recommended    The above risks/problems have been discussed with the patient.  Pertinent information has been shared with the patient in the After Visit Summary.    Diagnoses and all orders for this visit:    1. Medicare annual wellness visit, subsequent (Primary)    2. Screen for colon cancer  -     Cologuard - Stool, Per Rectum; Future    3. Deep vein thrombosis (DVT) of proximal vein of right lower extremity, unspecified chronicity (HCC)  -     Comprehensive Metabolic Panel; Future  -     CBC & Differential; Future    4. Need for  vaccination  -     Pneumococcal Conjugate Vaccine 20-Valent All    Diet: eating good, he does enjoy vegetables and water, no excess sweets, no alcohol  Exercise: Walking 5 to 10 minutes a day  Immunizations: Discussed, due for pneumonia shot, he does remember getting his COVID boosters at [facility].  Colon cancer screening: Due, never had colon cancer screening, unable to drive as he is legally blind, his dad is the only  in his home, limitations with transportation   Sleep/mental health: doing well no complaints  Dentist: Past due    Since he was last seen he unfortunately encountered a second DVT-hospitalized November 23, 2022 and was recommended to be on lifelong anticoagulation, he is currently on Eliquis.  He did have an IVC filter placed in 2012.  He has been tolerating the Eliquis, no symptoms of bleeding, feeling well no questions or complaints today.    Here for annual exam, fasting labs reviewed with patient as well as father, immunizations up-to-date, colon cancer screening ordered as above, cardiovascular screening negative for symptoms, counseled patient to increase vegetable intake, water and 150 minutes of exercise weekly combining weightbearing exercises and aerobic activity.     Discussed Eliquis with second DVT, thankfully he is tolerating well no adverse effects of bleeding.  We will follow-up in 6 months, plan to get CMP and CBC prior.      Follow Up:   Next Medicare Wellness visit to be scheduled in 1 year.    Return in about 6 months (around 7/9/2023), or if symptoms worsen or fail to improve, for Recheck DVT with labs prior .    An After Visit Summary and PPPS were made available to the patient.    Medical assistant and I wore mask and eyewear protection during entire encounter.  Patient  wore mask.    Bushra Ruiz MD             He has been tolerating the Eliquis, no symptoms of bleeding, feeling well no questions or complaints today.    Here for annual exam, fasting labs reviewed with patient as well as father, immunizations up-to-date, colon cancer screening ordered as above, cardiovascular screening negative for symptoms, counseled patient to increase vegetable intake, water and 150 minutes of exercise weekly combining weightbearing exercises and aerobic activity.     Discussed Eliquis with second DVT, thankfully he is tolerating well no adverse effects of bleeding.  We will follow-up in 6 months, plan to get CMP and CBC prior.      Follow Up:   Next Medicare Wellness visit to be scheduled in 1 year.    Return in about 6 months (around 7/9/2023), or if symptoms worsen or fail to improve, for Recheck DVT with labs prior .    An After Visit Summary and PPPS were made available to the patient.    Medical assistant and I wore mask and eyewear protection during entire encounter.  Patient  wore mask.    Bushra Ruiz MD

## 2023-01-12 DIAGNOSIS — G47.33 OSA ON CPAP: ICD-10-CM

## 2023-01-12 DIAGNOSIS — Z99.89 OSA ON CPAP: ICD-10-CM

## 2023-01-12 DIAGNOSIS — G47.14 HYPERSOMNIA DUE TO MEDICAL CONDITION: ICD-10-CM

## 2023-01-12 RX ORDER — SIMVASTATIN 10 MG
10 TABLET ORAL DAILY
Qty: 90 TABLET | Refills: 1 | Status: SHIPPED | OUTPATIENT
Start: 2023-01-12

## 2023-01-12 RX ORDER — ARMODAFINIL 250 MG/1
TABLET ORAL
Qty: 90 TABLET | Refills: 1 | Status: CANCELLED | OUTPATIENT
Start: 2023-01-12

## 2023-01-12 RX ORDER — TESTOSTERONE 16.2 MG/G
GEL TRANSDERMAL
Refills: 2 | Status: CANCELLED | OUTPATIENT
Start: 2023-01-12

## 2023-01-12 RX ORDER — LEVETIRACETAM 500 MG/1
500 TABLET ORAL DAILY
Qty: 90 TABLET | Refills: 1 | Status: CANCELLED | OUTPATIENT
Start: 2023-01-12

## 2023-01-12 NOTE — TELEPHONE ENCOUNTER
I did approve the statin and Eliquis    However, I have not prescribed the testosterone, he would need blood test for this and an appointment as it is controlled.  I would recommend that he contact the last provider that was prescribing this gel.    Also I have not written for the armodafinil either, the last prescription was by Dr. aLm, please contact him for a refill.  This is also a controlled medication and needs to be filled by the same provider.

## 2023-01-12 NOTE — TELEPHONE ENCOUNTER
Rx Refill Note  Requested Prescriptions      No prescriptions requested or ordered in this encounter      Last office visit with prescribing clinician: 1/9/2023   Last telemedicine visit with prescribing clinician: 7/5/2023   Next office visit with prescribing clinician: 7/10/2023                         Would you like a call back once the refill request has been completed: [] Yes [] No    If the office needs to give you a call back, can they leave a voicemail: [] Yes [] No    Freya Smith MA  01/12/23, 10:50 EST

## 2023-01-24 DIAGNOSIS — G47.33 OSA ON CPAP: ICD-10-CM

## 2023-01-24 DIAGNOSIS — G47.14 HYPERSOMNIA DUE TO MEDICAL CONDITION: ICD-10-CM

## 2023-01-24 DIAGNOSIS — Z99.89 OSA ON CPAP: ICD-10-CM

## 2023-01-24 RX ORDER — ARMODAFINIL 250 MG/1
TABLET ORAL
Qty: 90 TABLET | Refills: 1 | Status: SHIPPED | OUTPATIENT
Start: 2023-01-24

## 2023-02-28 RX ORDER — APIXABAN 5 MG/1
TABLET, FILM COATED ORAL
Qty: 180 TABLET | Refills: 3 | Status: SHIPPED | OUTPATIENT
Start: 2023-02-28

## 2023-05-24 RX ORDER — SIMVASTATIN 10 MG
10 TABLET ORAL DAILY
Qty: 90 TABLET | Refills: 3 | Status: SHIPPED | OUTPATIENT
Start: 2023-05-24

## 2023-05-31 ENCOUNTER — OFFICE VISIT (OUTPATIENT)
Dept: SLEEP MEDICINE | Facility: HOSPITAL | Age: 53
End: 2023-05-31
Payer: MEDICARE

## 2023-05-31 VITALS — WEIGHT: 227 LBS | HEART RATE: 86 BPM | HEIGHT: 70 IN | OXYGEN SATURATION: 99 % | BODY MASS INDEX: 32.5 KG/M2

## 2023-05-31 DIAGNOSIS — Z99.89 OSA ON CPAP: Primary | ICD-10-CM

## 2023-05-31 DIAGNOSIS — G47.33 OSA ON CPAP: Primary | ICD-10-CM

## 2023-05-31 DIAGNOSIS — G47.14 HYPERSOMNIA DUE TO MEDICAL CONDITION: ICD-10-CM

## 2023-05-31 PROCEDURE — G0463 HOSPITAL OUTPT CLINIC VISIT: HCPCS

## 2023-05-31 NOTE — PROGRESS NOTES
"Follow Up [facility] Note     Chief Complaint:  ELENA     Primary Care Physician: Bushra Gabriel MD    Interval History:   The patient is a 52 y.o. male  who I last saw 8/25/2022 and that note was reviewed.  The patient reports he is doing well without new complaints.  He goes to bed 11 PM and gets out of bed at 8 AM.  He states he just wakes up.    The patient continues to take armodafinil 250 mg one half tab daily    Review of Systems:    A complete review of systems was done and all were negative with the exception of the above    Social History:    Social History     Socioeconomic History   • Marital status: Single   Tobacco Use   • Smoking status: Never     Passive exposure: Never   • Smokeless tobacco: Never   Vaping Use   • Vaping Use: Never used   Substance and Sexual Activity   • Alcohol use: No   • Drug use: No   • Sexual activity: Yes       Allergies:  Patient has no known allergies.     Medication Review:  Reviewed.      Vital Signs:    Vitals:    05/31/23 1046   Pulse: 86   SpO2: 99%   Weight: 103 kg (227 lb)   Height: 177.8 cm (70\")     Body mass index is 32.57 kg/m².    Physical Exam:    Constitutional:  Well developed 52 y.o. male that appears in no apparent distress.  Awake & oriented times 3.  Normal mood with normal recent and remote memory and normal judgement.  Eyes:  Conjunctivae normal.  Oropharynx: Previously, moist mucous membranes without exudate and a large tongue and class III-4 Mallampati airway.    Self-administered [place] Sleepiness Scale test results: 8, previously 7  0-5 Lower normal daytime sleepiness  6-10 Higher normal daytime sleepiness  11-12 Mild, 13-15 Moderate, & 16-24 Severe excessive daytime sleepiness     Downloaded PAP Data Reviewed For Therapeutic Response and Compliance:  DME is [facility] and he uses a fullface mask.  Downloads between 3/1 and 5/29/2023 compliance 98%.  Average usage is 7 hours and 27 minutes.  Average AHI is normal without significant leak.  " Average auto CPAP pressure is 11.5 and his auto CPAP is 10-15    I have reviewed the above results and compared them with the patient's last downloads and reviewed with the patient.    Impression:   Obstructive sleep apnea adequately treated with auto CPAP. The patient appears to be at goal with good compliance and usage. The patient has no significant complaints of hypersomnolence since he continues to take armodafinil 250 mg one half tab daily.    Plan:  Good sleep hygiene measures should be maintained.  Weight loss would be beneficial in this patient who is obese by Body mass index is 32.57 kg/m²..      After evaluating the patient and assessing results available, the patient is benefiting from the treatment being provided.     The patient will continue DreamStation auto CPAP.  Potential side effects of CPAP therapy reviewed and addressed as needed.  After clinical evaluation and review of downloads, I recommend no changes to the patient's pressures.  A new prescription will be sent to the patient's DME.    The patient will continue armodafinil 250 mg one half tab daily.  Edgard reviewed and there are no irregularities.  It is noted that he takes testosterone.    I answered all of the patient's questions.  The patient will call the [facility] Disorder Center for any problems with side effects of CPAP therapy and will follow up in 6 months.      Neil Lam MD  Sleep Medicine  05/31/23  10:49 EDT

## 2023-11-29 ENCOUNTER — OFFICE VISIT (OUTPATIENT)
Dept: SLEEP MEDICINE | Facility: HOSPITAL | Age: 53
End: 2023-11-29
Payer: MEDICARE

## 2023-11-29 VITALS — BODY MASS INDEX: 34.36 KG/M2 | HEIGHT: 70 IN | OXYGEN SATURATION: 97 % | HEART RATE: 83 BPM | WEIGHT: 240 LBS

## 2023-11-29 DIAGNOSIS — G47.14 HYPERSOMNIA DUE TO MEDICAL CONDITION: ICD-10-CM

## 2023-11-29 DIAGNOSIS — G47.33 OSA ON CPAP: Primary | ICD-10-CM

## 2023-11-29 PROCEDURE — G0463 HOSPITAL OUTPT CLINIC VISIT: HCPCS

## 2023-11-29 NOTE — PROGRESS NOTES
"Follow Up [facility] Disorders Center Note     Chief Complaint:  ELENA     Since I last saw him, he did receive a new DreamStation auto CPAP from [facility] related to the recall.    Primary Care Physician: Bushra Gabriel MD    Interval History:   The patient is a 53 y.o. male  who I last saw 5/31/2023 and that note was reviewed.  Patient reports he is unchanged without new complaints.  He goes to bed at 9 PM gets out of bed at 6 AM    The patient continues to take armodafinil 250 mg one half tab daily    Review of Systems:    A complete review of systems was done and all were negative with the exception of the above    Social History:    Social History     Socioeconomic History    Marital status: Single   Tobacco Use    Smoking status: Never     Passive exposure: Never    Smokeless tobacco: Never   Vaping Use    Vaping Use: Never used   Substance and Sexual Activity    Alcohol use: No    Drug use: No    Sexual activity: Yes       Allergies:  Patient has no known allergies.     Medication Review:  Reviewed.      Vital Signs:    Vitals:    11/29/23 1105   Pulse: 83   SpO2: 97%   Weight: 109 kg (240 lb)   Height: 177.8 cm (70\")     Body mass index is 34.44 kg/m².    Physical Exam:    Constitutional:  Well developed 53 y.o. male that appears in no apparent distress.  Awake & oriented times 3.  Normal mood with normal recent and remote memory and normal judgement.  Eyes:  Conjunctivae normal.  Oropharynx: Previously, moist mucous membranes without exudate and a large tongue and class III-4 Mallampati airway.    Self-administered [place] Sleepiness Scale test results: 7  0-5 Lower normal daytime sleepiness  6-10 Higher normal daytime sleepiness  11-12 Mild, 13-15 Moderate, & 16-24 Severe excessive daytime sleepiness     Downloaded PAP Data Evaluated For Therapeutic Response and Compliance:  DME is [facility] and he uses a fullface mask.  Downloads between 8/29 and 11/26/2023 compliance 99%.  Average usage is 7 hours and 37 minutes. "  Average AHI is normal with an average large leak of 43 minutes.  Average auto CPAP pressure is 11.2 and his new DreamStation auto CPAP is set at 10-15    I have reviewed the above results and compared them with the patient's last downloads and reviewed with the patient.    Impression:   Obstructive sleep apnea adequately treated with new DreamStation auto CPAP. The patient appears to be at goal with good compliance and usage. The patient has no significant complaints of hypersomnolence since he continues to take armodafinil 250 mg one half tab daily.    Plan:  Good sleep hygiene measures should be maintained.  Weight loss would be beneficial in this patient who is obese by Body mass index is 34.44 kg/m²..      After evaluating the patient and assessing results available, the patient is benefiting from the treatment being provided.     The patient will continue new DreamStation auto CPAP.  Potential side effects of CPAP therapy reviewed and addressed as needed.  After clinical evaluation and review of downloads, I recommend no changes to the patient's pressures.  A new prescription will be sent to the patient's DME.    I answered all of the patient's questions.  The patient will call the [facility] Center for any problems and will follow up in 6 months.      Neil Lam MD  Sleep Medicine  11/29/23  11:16 EST

## 2024-02-01 ENCOUNTER — TELEPHONE (OUTPATIENT)
Dept: FAMILY MEDICINE CLINIC | Facility: CLINIC | Age: 54
End: 2024-02-01
Payer: MEDICARE

## 2024-02-01 RX ORDER — TAMSULOSIN HYDROCHLORIDE 0.4 MG/1
1 CAPSULE ORAL DAILY
Qty: 30 CAPSULE | Refills: 3 | Status: SHIPPED | OUTPATIENT
Start: 2024-02-01

## 2024-02-01 NOTE — TELEPHONE ENCOUNTER
Spoke with pt and he said is almost out and Dr graham prescribed before ,I can't find  it on file

## 2024-02-01 NOTE — TELEPHONE ENCOUNTER
Caller: Bernard Cruz Jr.    Relationship: Self    Best call back number: [phone]    Requested Prescriptions:   Requested Prescriptions      No prescriptions requested or ordered in this encounter        Pharmacy where request should be sent: [facility] [id number] [address] AT [facility]. - [phone]  - [phone] FX     Last office visit with prescribing clinician: 7/10/2023   Last telemedicine visit with prescribing clinician: Visit date not found   Next office visit with prescribing clinician: Visit date not found     Additional details provided by patient: PATIENT IS NEEDING TAMSULOSIN 0.4 MG CAPSULES     Does the patient have less than a 3 day supply:  [x] Yes  [] No    Would you like a call back once the refill request has been completed: [] Yes [x] No    If the office needs to give you a call back, can they leave a voicemail: [] Yes [x] No    Yolis Gonzalez Rep   02/01/24 11:57 EST

## 2024-02-02 RX ORDER — APIXABAN 5 MG/1
TABLET, FILM COATED ORAL
Qty: 180 TABLET | Refills: 3 | Status: SHIPPED | OUTPATIENT
Start: 2024-02-02

## 2024-02-21 DIAGNOSIS — G47.14 HYPERSOMNIA DUE TO MEDICAL CONDITION: ICD-10-CM

## 2024-02-21 DIAGNOSIS — G47.33 OSA ON CPAP: ICD-10-CM

## 2024-02-21 RX ORDER — ARMODAFINIL 250 MG/1
TABLET ORAL
Qty: 90 TABLET | Refills: 1 | Status: SHIPPED | OUTPATIENT
Start: 2024-02-21

## 2024-02-22 ENCOUNTER — TELEPHONE (OUTPATIENT)
Dept: SLEEP MEDICINE | Facility: HOSPITAL | Age: 54
End: 2024-02-22
Payer: MEDICARE

## 2024-03-08 RX ORDER — SIMVASTATIN 10 MG
10 TABLET ORAL DAILY
Qty: 90 TABLET | Refills: 1 | Status: SHIPPED | OUTPATIENT
Start: 2024-03-08

## 2024-03-08 NOTE — TELEPHONE ENCOUNTER
Please schedule [facility] w fasting labs prior     Rx Refill Note  Requested Prescriptions     Pending Prescriptions Disp Refills    simvastatin (ZOCOR) 10 MG tablet [Pharmacy Med Name: Simvastatin 10 MG Oral Tablet] 90 tablet 1     Sig: TAKE 1 TABLET BY MOUTH DAILY      Last office visit with prescribing clinician: 7/10/2023   Last telemedicine visit with prescribing clinician: Visit date not found   Next office visit with prescribing clinician: Visit date not found                         Would you like a call back once the refill request has been completed: [] Yes [] No    If the office needs to give you a call back, can they leave a voicemail: [] Yes [] No    Karen Ocasio CMA/LMR  03/08/24, 15:30 EST

## 2024-05-29 ENCOUNTER — OFFICE VISIT (OUTPATIENT)
Dept: SLEEP MEDICINE | Facility: HOSPITAL | Age: 54
End: 2024-05-29
Payer: MEDICARE

## 2024-05-29 VITALS — HEIGHT: 70 IN | BODY MASS INDEX: 36.08 KG/M2 | WEIGHT: 252 LBS | HEART RATE: 96 BPM | OXYGEN SATURATION: 96 %

## 2024-05-29 DIAGNOSIS — E66.01 CLASS 2 SEVERE OBESITY DUE TO EXCESS CALORIES WITH SERIOUS COMORBIDITY AND BODY MASS INDEX (BMI) OF 36.0 TO 36.9 IN ADULT: ICD-10-CM

## 2024-05-29 DIAGNOSIS — G47.14 HYPERSOMNIA DUE TO MEDICAL CONDITION: ICD-10-CM

## 2024-05-29 DIAGNOSIS — G47.33 OSA ON CPAP: Primary | ICD-10-CM

## 2024-05-29 PROCEDURE — 99213 OFFICE O/P EST LOW 20 MIN: CPT | Performed by: INTERNAL MEDICINE

## 2024-05-29 PROCEDURE — 1159F MED LIST DOCD IN RCRD: CPT | Performed by: INTERNAL MEDICINE

## 2024-05-29 PROCEDURE — 1160F RVW MEDS BY RX/DR IN RCRD: CPT | Performed by: INTERNAL MEDICINE

## 2024-05-29 PROCEDURE — G0463 HOSPITAL OUTPT CLINIC VISIT: HCPCS

## 2024-05-29 NOTE — PROGRESS NOTES
"Follow Up [facility] Note     Chief Complaint:  ELENA     Primary Care Physician: Bushra Gabriel MD    Interval History:   The patient is a 53 y.o. male who I last saw 11/29/2023 and that note was reviewed.  The patient reports he is unchanged without new complaints.  He goes to bed around 9 PM and gets out of bed at 6 AM    The patient continues to take armodafinil 250 mg tablets, one half tab daily    Review of Systems:    A complete review of systems was done and all were negative with the exception of the above    Social History:    Social History     Socioeconomic History    Marital status: Single   Tobacco Use    Smoking status: Never     Passive exposure: Never    Smokeless tobacco: Never   Vaping Use    Vaping status: Never Used   Substance and Sexual Activity    Alcohol use: No    Drug use: No    Sexual activity: Yes       Allergies:  Patient has no known allergies.     Medication Review:  Reviewed.      Vital Signs:    Vitals:    05/29/24 1201   Pulse: 96   SpO2: 96%   Weight: 114 kg (252 lb)   Height: 177.8 cm (70\")     Body mass index is 36.16 kg/m².    Physical Exam:    Constitutional:  Well developed 53 y.o. male that appears in no apparent distress.  Awake & oriented times 3.  Normal mood with normal recent and remote memory and normal judgement.  Eyes:  Conjunctivae normal.  Oropharynx: Previously, moist mucous membranes without exudate and a large tongue and class III-4 Mallampati airway.    Self-administered [place] Sleepiness Scale test results: 3, previously 7  0-5 Lower normal daytime sleepiness  6-10 Higher normal daytime sleepiness  11-12 Mild, 13-15 Moderate, & 16-24 Severe excessive daytime sleepiness     Downloaded PAP Data Evaluated For Therapeutic Response and Compliance:  DME is [facility] and he uses a fullface mask.  Downloads between 2/27 and 5/26/2024 compliance 100%.  Average usage is 6 hours and 1 minute.  Average AHI is normal but he has average time in large leak of 3 " hours and 28 minutes.  Average auto CPAP pressure is 10.3 and his new DreamStation auto CPAP is set at 10-15    I have reviewed the above results and compared them with the patient's last downloads and reviewed with the patient.    Impression:   Obstructive sleep apnea adequately treated with new DreamStation auto CPAP. The patient appears to be at goal with good compliance and usage. The patient has no significant complaints of hypersomnolence since he continues to take armodafinil 250 mg one half tab daily.     Plan:  Good sleep hygiene measures should be maintained.  Weight loss would be beneficial in this patient who has class II severe obesity by Body mass index is 36.16 kg/m².      After evaluating the patient and assessing results available, the patient is benefiting from the treatment being provided.     The patient will continue new DreamStation auto CPAP.  Potential side effects of not using PAP therapy reviewed and addressed as needed.  After clinical evaluation and review of downloads, I recommend no changes to the patient's pressures.  A new prescription will be sent to the patient's DME.    I answered all of the patient's questions.  The patient will call the [facility] for any problems and will follow up in 6 months.      Neil Lam MD  Sleep Medicine  05/29/24  12:04 EDT

## 2024-06-01 PROBLEM — E66.01 CLASS 2 SEVERE OBESITY DUE TO EXCESS CALORIES WITH SERIOUS COMORBIDITY AND BODY MASS INDEX (BMI) OF 36.0 TO 36.9 IN ADULT: Status: ACTIVE | Noted: 2024-06-01

## 2024-06-01 PROBLEM — E66.812 CLASS 2 SEVERE OBESITY DUE TO EXCESS CALORIES WITH SERIOUS COMORBIDITY AND BODY MASS INDEX (BMI) OF 36.0 TO 36.9 IN ADULT: Status: ACTIVE | Noted: 2024-06-01

## 2024-08-02 ENCOUNTER — TELEPHONE (OUTPATIENT)
Dept: SLEEP MEDICINE | Facility: HOSPITAL | Age: 54
End: 2024-08-02
Payer: MEDICARE

## 2024-11-20 ENCOUNTER — OFFICE VISIT (OUTPATIENT)
Dept: SLEEP MEDICINE | Facility: HOSPITAL | Age: 54
End: 2024-11-20
Payer: MEDICARE

## 2024-11-20 VITALS — BODY MASS INDEX: 36.08 KG/M2 | OXYGEN SATURATION: 93 % | HEIGHT: 70 IN | HEART RATE: 107 BPM | WEIGHT: 252 LBS

## 2024-11-20 DIAGNOSIS — E66.01 CLASS 2 SEVERE OBESITY DUE TO EXCESS CALORIES WITH SERIOUS COMORBIDITY AND BODY MASS INDEX (BMI) OF 36.0 TO 36.9 IN ADULT: ICD-10-CM

## 2024-11-20 DIAGNOSIS — E66.812 CLASS 2 SEVERE OBESITY DUE TO EXCESS CALORIES WITH SERIOUS COMORBIDITY AND BODY MASS INDEX (BMI) OF 36.0 TO 36.9 IN ADULT: ICD-10-CM

## 2024-11-20 DIAGNOSIS — G47.14 HYPERSOMNIA DUE TO MEDICAL CONDITION: ICD-10-CM

## 2024-11-20 DIAGNOSIS — G47.33 OSA ON CPAP: Primary | ICD-10-CM

## 2024-11-20 PROCEDURE — G0463 HOSPITAL OUTPT CLINIC VISIT: HCPCS

## 2024-11-20 PROCEDURE — 99213 OFFICE O/P EST LOW 20 MIN: CPT | Performed by: INTERNAL MEDICINE

## 2024-11-20 PROCEDURE — 1159F MED LIST DOCD IN RCRD: CPT | Performed by: INTERNAL MEDICINE

## 2024-11-20 PROCEDURE — 1160F RVW MEDS BY RX/DR IN RCRD: CPT | Performed by: INTERNAL MEDICINE

## 2024-11-20 NOTE — PROGRESS NOTES
"Follow Up [facility] Note     Chief Complaint:  ELENA     Primary Care Physician: Bushra Gabriel MD    Interval History:   The patient is a 54 y.o. male who I last saw 5/29/2024 and that note was reviewed.  The patient reports he is stable without new complaints.  He goes to bed around 9 PM gets out of bed around 6 AM.    The patient continues to take armodafinil 250 mg tablets, one half tab daily.    Review of Systems:    A complete review of systems was done and all were negative with the exception of the above    Social History:    Social History     Socioeconomic History    Marital status: Single   Tobacco Use    Smoking status: Never     Passive exposure: Never    Smokeless tobacco: Never   Vaping Use    Vaping status: Never Used   Substance and Sexual Activity    Alcohol use: No    Drug use: No    Sexual activity: Yes       Allergies:  Patient has no known allergies.     Medication Review:  Reviewed.      Vital Signs:    Vitals:    11/20/24 1030   Pulse: 107   SpO2: 93%   Weight: 114 kg (252 lb)   Height: 177.8 cm (70\")     Body mass index is 36.16 kg/m².    Physical Exam:    Constitutional:  Well developed 54 y.o. male that appears in no apparent distress.  Awake & oriented times 3.  Normal mood with normal recent and remote memory and normal judgement.  Eyes:  Conjunctivae normal.  Oropharynx: Previously, moist mucous membranes without exudate and a large tongue and class III-4 Mallampati airway.    Self-administered [place] Sleepiness Scale test results: 4, previously 3 and prior to that 7  0-5 Lower normal daytime sleepiness  6-10 Higher normal daytime sleepiness  11-12 Mild, 13-15 Moderate, & 16-24 Severe excessive daytime sleepiness     Downloaded PAP Data Evaluated For Therapeutic Response and Compliance:  DME is [employer] and the patient uses a fullface mask.  Downloads between 8/20 and 11/17/2024 compliance 96%.  Average usage is 6 hours and 23 minutes.  Average AHI is normal with an average " large leak of 2 hours and 26 minutes.  Average auto CPAP pressure is 10.8 and his new DreamStation auto CPAP is set at 10-15    I have reviewed the above results and compared them with the patient's last downloads and reviewed with the patient.    Impression:   Obstructive sleep apnea adequately treated with new DreamStation auto CPAP. The patient appears to be at goal with good compliance and usage. The patient has no significant complaints of hypersomnolence since he continues to take armodafinil 250 mg one half tab daily.     Plan:  Good sleep hygiene measures should be maintained.  Weight loss would be beneficial in this patient who has class II severe obesity by Body mass index is 36.16 kg/m².      After evaluating the patient and assessing results available, the patient is benefiting from the treatment being provided.     The patient will continue new DreamStation auto CPAP.  Potential side effects of not using PAP therapy reviewed and addressed as needed.  After clinical evaluation and review of downloads, I recommend no changes to the patient's pressures.  A new prescription will be sent to the patient's DME.    The patient will continue armodafinil 250 mg one half tab daily.  Edgard reviewed and there are no irregularities.  It is noted that the patient takes testosterone.    I answered all of the patient's questions.  The patient will call the [facility] for any problems and will follow up in 6 or 7 months.      Neil Lam MD  Sleep Medicine  11/20/24  10:35 EST

## 2024-12-03 ENCOUNTER — TELEPHONE (OUTPATIENT)
Dept: FAMILY MEDICINE CLINIC | Facility: CLINIC | Age: 54
End: 2024-12-03
Payer: MEDICARE

## 2024-12-03 NOTE — TELEPHONE ENCOUNTER
Patients Mother calling with patient complaints of swelling in legs, declined redness, possibly warm to touch. Patient advised to proceed to ER, patient declined. Patient has history of blood clots and stents. Patient is scheduled with Dr. Mark tomorrow at 9 am, due to patients assigned PCP being out of office.

## 2024-12-04 ENCOUNTER — OFFICE VISIT (OUTPATIENT)
Dept: FAMILY MEDICINE CLINIC | Facility: CLINIC | Age: 54
End: 2024-12-04
Payer: MEDICARE

## 2024-12-04 VITALS
WEIGHT: 253.6 LBS | SYSTOLIC BLOOD PRESSURE: 124 MMHG | TEMPERATURE: 97.1 F | HEART RATE: 82 BPM | HEIGHT: 70 IN | BODY MASS INDEX: 36.31 KG/M2 | OXYGEN SATURATION: 96 % | DIASTOLIC BLOOD PRESSURE: 90 MMHG

## 2024-12-04 DIAGNOSIS — M79.89 SWELLING OF LOWER EXTREMITY: Primary | ICD-10-CM

## 2024-12-04 DIAGNOSIS — R73.03 PREDIABETES: ICD-10-CM

## 2024-12-04 DIAGNOSIS — E03.9 HYPOTHYROIDISM, UNSPECIFIED TYPE: ICD-10-CM

## 2024-12-04 DIAGNOSIS — I10 HYPERTENSION, UNSPECIFIED TYPE: ICD-10-CM

## 2024-12-04 DIAGNOSIS — Z23 NEED FOR VACCINATION: ICD-10-CM

## 2024-12-04 DIAGNOSIS — E66.812 CLASS 2 OBESITY WITHOUT SERIOUS COMORBIDITY WITH BODY MASS INDEX (BMI) OF 36.0 TO 36.9 IN ADULT, UNSPECIFIED OBESITY TYPE: ICD-10-CM

## 2024-12-04 PROCEDURE — 3074F SYST BP LT 130 MM HG: CPT | Performed by: STUDENT IN AN ORGANIZED HEALTH CARE EDUCATION/TRAINING PROGRAM

## 2024-12-04 PROCEDURE — 90656 IIV3 VACC NO PRSV 0.5 ML IM: CPT | Performed by: STUDENT IN AN ORGANIZED HEALTH CARE EDUCATION/TRAINING PROGRAM

## 2024-12-04 PROCEDURE — 99214 OFFICE O/P EST MOD 30 MIN: CPT | Performed by: STUDENT IN AN ORGANIZED HEALTH CARE EDUCATION/TRAINING PROGRAM

## 2024-12-04 PROCEDURE — 3080F DIAST BP >= 90 MM HG: CPT | Performed by: STUDENT IN AN ORGANIZED HEALTH CARE EDUCATION/TRAINING PROGRAM

## 2024-12-04 PROCEDURE — 1126F AMNT PAIN NOTED NONE PRSNT: CPT | Performed by: STUDENT IN AN ORGANIZED HEALTH CARE EDUCATION/TRAINING PROGRAM

## 2024-12-04 PROCEDURE — G0008 ADMIN INFLUENZA VIRUS VAC: HCPCS | Performed by: STUDENT IN AN ORGANIZED HEALTH CARE EDUCATION/TRAINING PROGRAM

## 2024-12-04 RX ORDER — HYDROCHLOROTHIAZIDE 25 MG/1
25 TABLET ORAL DAILY
Qty: 30 TABLET | Refills: 1 | Status: SHIPPED | OUTPATIENT
Start: 2024-12-04

## 2024-12-04 RX ORDER — LEVETIRACETAM 500 MG/1
500 TABLET ORAL
COMMUNITY
Start: 2024-11-18 | End: 2025-11-18

## 2024-12-04 NOTE — PROGRESS NOTES
"Chief Complaint  Leg Swelling (Pt states the swelling started about 1mo ago, pt isnt aware of any hypertension, pt is not fasting. Possible fatigue per mother. Requesting flu shot.)    Subjective        Bernard Cruz . 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    Weight as of this encounter: 115 kg (253 lb 9.6 oz).          Physical Exam  Constitutional:       General: He is not in acute distress.     Appearance: Normal appearance. He is not ill-appearing.   HENT:      Head: Normocephalic and atraumatic.   Eyes:      Extraocular Movements: Extraocular movements intact.   Cardiovascular:      Rate and Rhythm: Normal rate and regular rhythm.      Heart sounds: Normal heart sounds. No murmur heard.  Pulmonary:      Effort: Pulmonary effort is normal.   Musculoskeletal:      Right lower leg: Edema present.      Left lower leg: Edema present.      Comments: Symmetric, diffuse swelling of bilateral lower " extremities with slight pitting edema of LLE and no pitting edema of RLE; no tenderness to palpation of b/l LE; no overlying skin changes   Skin:     Findings: No lesion or rash.   Neurological:      Mental Status: He is alert.        Result Review :                Assessment and Plan   Diagnoses and all orders for this visit:    1. Swelling of lower extremity (Primary)  -Given symmetrical swelling and patient's consistent use of Eliquis, low suspicion for DVT at this time  -No symptoms of orthopnea or TOWNSEND to raise suspicion for CHF  -will start with below labs to rule out renal or liver dysfunction or changes in patient's underlying hypothyroidism  -In the meantime, will discontinue home amlodipine and replace with hydrochlorothiazide  -Patient and mother advised to check home BP daily, keep log, and return to clinic in 1 month for follow-up    -     Comprehensive Metabolic Panel  -     CBC & Differential  -     TSH Rfx On Abnormal To Free T4  -     Hemoglobin A1c  -     Urinalysis With Microscopic If Indicated (No Culture) - Urine, Clean Catch  -     Microalbumin / Creatinine Urine Ratio - Urine, Clean Catch  -     hydroCHLOROthiazide 25 MG tablet; Take 1 tablet by mouth Daily.  Dispense: 30 tablet; Refill: 1    3. Hypothyroidism, unspecified type  -     TSH Rfx On Abnormal To Free T4    4. Class 2 obesity without serious comorbidity with body mass index (BMI) of 36.0 to 36.9 in adult, unspecified obesity type  -     TSH Rfx On Abnormal To Free T4  -     Hemoglobin A1c    5. Prediabetes  -     Hemoglobin A1c    6. Need for vaccination  -     Fluzone >6mos ([id number])         Follow Up   Return in about 1 month (around 1/4/2025) for HTN, LE swelling.  Patient was given instructions and counseling regarding his condition or for health maintenance advice. Please see specific information pulled into the AVS if appropriate.

## 2024-12-05 LAB
ALBUMIN SERPL-MCNC: 4.2 G/DL (ref 3.5–5.2)
ALBUMIN/CREAT UR: 5 MG/G CREAT (ref 0–29)
ALBUMIN/GLOB SERPL: 1.3 G/DL
ALP SERPL-CCNC: 74 U/L (ref 39–117)
ALT SERPL-CCNC: 14 U/L (ref 1–41)
APPEARANCE UR: CLEAR
AST SERPL-CCNC: 25 U/L (ref 1–40)
BASOPHILS # BLD AUTO: 0.08 10*3/MM3 (ref 0–0.2)
BASOPHILS NFR BLD AUTO: 0.9 % (ref 0–1.5)
BILIRUB SERPL-MCNC: 0.4 MG/DL (ref 0–1.2)
BILIRUB UR QL STRIP: NEGATIVE
BUN SERPL-MCNC: 10 MG/DL (ref 6–20)
BUN/CREAT SERPL: 8.8 (ref 7–25)
CALCIUM SERPL-MCNC: 9.8 MG/DL (ref 8.6–10.5)
CHLORIDE SERPL-SCNC: 100 MMOL/L (ref 98–107)
CO2 SERPL-SCNC: 26.5 MMOL/L (ref 22–29)
COLOR UR: YELLOW
CREAT SERPL-MCNC: 1.13 MG/DL (ref 0.76–1.27)
CREAT UR-MCNC: 161.3 MG/DL
EGFRCR SERPLBLD CKD-EPI 2021: 76.3 ML/MIN/1.73
EOSINOPHIL # BLD AUTO: 0.17 10*3/MM3 (ref 0–0.4)
EOSINOPHIL NFR BLD AUTO: 1.9 % (ref 0.3–6.2)
ERYTHROCYTE [DISTWIDTH] IN BLOOD BY AUTOMATED COUNT: 15.1 % (ref 12.3–15.4)
GLOBULIN SER CALC-MCNC: 3.2 GM/DL
GLUCOSE SERPL-MCNC: 95 MG/DL (ref 65–99)
GLUCOSE UR QL STRIP: NEGATIVE
HBA1C MFR BLD: 6.3 % (ref 4.8–5.6)
HCT VFR BLD AUTO: 45.6 % (ref 37.5–51)
HGB BLD-MCNC: 14.5 G/DL (ref 13–17.7)
HGB UR QL STRIP: NEGATIVE
IMM GRANULOCYTES # BLD AUTO: 0.05 10*3/MM3 (ref 0–0.05)
IMM GRANULOCYTES NFR BLD AUTO: 0.6 % (ref 0–0.5)
KETONES UR QL STRIP: NEGATIVE
LEUKOCYTE ESTERASE UR QL STRIP: NEGATIVE
LYMPHOCYTES # BLD AUTO: 1.97 10*3/MM3 (ref 0.7–3.1)
LYMPHOCYTES NFR BLD AUTO: 22.3 % (ref 19.6–45.3)
MCH RBC QN AUTO: 28.2 PG (ref 26.6–33)
MCHC RBC AUTO-ENTMCNC: 31.8 G/DL (ref 31.5–35.7)
MCV RBC AUTO: 88.5 FL (ref 79–97)
MICROALBUMIN UR-MCNC: 7.7 UG/ML
MONOCYTES # BLD AUTO: 0.69 10*3/MM3 (ref 0.1–0.9)
MONOCYTES NFR BLD AUTO: 7.8 % (ref 5–12)
NEUTROPHILS # BLD AUTO: 5.89 10*3/MM3 (ref 1.7–7)
NEUTROPHILS NFR BLD AUTO: 66.5 % (ref 42.7–76)
NITRITE UR QL STRIP: NEGATIVE
NRBC BLD AUTO-RTO: 0 /100 WBC (ref 0–0.2)
PH UR STRIP: 6.5 [PH] (ref 5–8)
PLATELET # BLD AUTO: 295 10*3/MM3 (ref 140–450)
POTASSIUM SERPL-SCNC: 4.3 MMOL/L (ref 3.5–5.2)
PROT SERPL-MCNC: 7.4 G/DL (ref 6–8.5)
PROT UR QL STRIP: NEGATIVE
RBC # BLD AUTO: 5.15 10*6/MM3 (ref 4.14–5.8)
SODIUM SERPL-SCNC: 139 MMOL/L (ref 136–145)
SP GR UR STRIP: 1.02 (ref 1–1.03)
TSH SERPL DL<=0.005 MIU/L-ACNC: 1.23 UIU/ML (ref 0.27–4.2)
UROBILINOGEN UR STRIP-MCNC: NORMAL MG/DL
WBC # BLD AUTO: 8.85 10*3/MM3 (ref 3.4–10.8)

## 2024-12-09 NOTE — TELEPHONE ENCOUNTER
Thank you for letting me know, Dr. Mark will take good care of him.  I am happy to help with anything needed to get stat ultrasounds performed

## 2024-12-11 ENCOUNTER — TELEPHONE (OUTPATIENT)
Dept: FAMILY MEDICINE CLINIC | Facility: CLINIC | Age: 54
End: 2024-12-11
Payer: MEDICARE

## 2024-12-11 NOTE — TELEPHONE ENCOUNTER
"Pt was advised to call back to discuss lab results via v/m.     Relay per :    \"Please let them know that patient's labs were fully normal and reassuring.  The only abnormality to note is redemonstration of prediabetes, which was last seen in blood work 2 years ago. Prediabetes can often be improved with dietary changes alone and unlikely to be related to the new leg swelling.  Otherwise, labs showed normal complete blood count, normal kidney and liver function, and normal thyroid.    Please also ask if patient's leg swelling has improved since discontinuing the amlodipine and what his home blood pressure measurements have been reading since we made the medication switch.\"      "

## 2024-12-16 RX ORDER — APIXABAN 5 MG/1
5 TABLET, FILM COATED ORAL EVERY 12 HOURS SCHEDULED
Qty: 180 TABLET | Refills: 3 | Status: SHIPPED | OUTPATIENT
Start: 2024-12-16

## 2025-01-29 DIAGNOSIS — I10 HYPERTENSION, UNSPECIFIED TYPE: ICD-10-CM

## 2025-01-29 RX ORDER — HYDROCHLOROTHIAZIDE 25 MG/1
25 TABLET ORAL DAILY
Qty: 30 TABLET | Refills: 1 | Status: SHIPPED | OUTPATIENT
Start: 2025-01-29

## 2025-04-03 DIAGNOSIS — I10 HYPERTENSION, UNSPECIFIED TYPE: ICD-10-CM

## 2025-04-03 RX ORDER — HYDROCHLOROTHIAZIDE 25 MG/1
25 TABLET ORAL DAILY
Qty: 30 TABLET | Refills: 1 | Status: SHIPPED | OUTPATIENT
Start: 2025-04-03

## 2025-05-22 ENCOUNTER — OFFICE VISIT (OUTPATIENT)
Dept: SLEEP MEDICINE | Facility: HOSPITAL | Age: 55
End: 2025-05-22
Payer: MEDICARE

## 2025-05-22 VITALS — HEIGHT: 70 IN | HEART RATE: 90 BPM | OXYGEN SATURATION: 96 % | WEIGHT: 244 LBS | BODY MASS INDEX: 34.93 KG/M2

## 2025-05-22 DIAGNOSIS — E66.01 CLASS 2 SEVERE OBESITY DUE TO EXCESS CALORIES WITH SERIOUS COMORBIDITY AND BODY MASS INDEX (BMI) OF 35.0 TO 35.9 IN ADULT: Primary | ICD-10-CM

## 2025-05-22 DIAGNOSIS — E66.812 CLASS 2 SEVERE OBESITY DUE TO EXCESS CALORIES WITH SERIOUS COMORBIDITY AND BODY MASS INDEX (BMI) OF 35.0 TO 35.9 IN ADULT: Primary | ICD-10-CM

## 2025-05-22 DIAGNOSIS — G47.33 OSA ON CPAP: ICD-10-CM

## 2025-05-22 DIAGNOSIS — G47.14 HYPERSOMNIA DUE TO MEDICAL CONDITION: ICD-10-CM

## 2025-05-22 PROCEDURE — G0463 HOSPITAL OUTPT CLINIC VISIT: HCPCS

## 2025-05-22 PROCEDURE — 1159F MED LIST DOCD IN RCRD: CPT | Performed by: INTERNAL MEDICINE

## 2025-05-22 PROCEDURE — 1160F RVW MEDS BY RX/DR IN RCRD: CPT | Performed by: INTERNAL MEDICINE

## 2025-05-22 PROCEDURE — 99213 OFFICE O/P EST LOW 20 MIN: CPT | Performed by: INTERNAL MEDICINE

## 2025-05-22 PROCEDURE — G2211 COMPLEX E/M VISIT ADD ON: HCPCS | Performed by: INTERNAL MEDICINE

## 2025-05-26 NOTE — PROGRESS NOTES
"[facility]  Follow Up Sleep Disorders Center Note     Chief Complaint:  ELENA     Primary Care Physician: Bushra Gabriel MD    Interval History:   The patient is a 54 y.o. male who I last saw 11/20/2024 and that note was reviewed.  The patient reports he is as stable as he can be.  Overall he is unchanged.  He goes to bed at 10 PM gets out of bed at 8 AM.    The patient continues to take armodafinil 250 mg tablets, one half tab daily.     Review of Systems:    A complete review of systems was done and all were negative with the exception of the above    Social History:    Social History     Socioeconomic History    Marital status: Single   Tobacco Use    Smoking status: Never     Passive exposure: Never    Smokeless tobacco: Never   Vaping Use    Vaping status: Never Used   Substance and Sexual Activity    Alcohol use: No    Drug use: No    Sexual activity: Yes       Allergies:  Patient has no known allergies.     Medication Review:  Reviewed.      Vital Signs:    Vitals:    05/22/25 1448   Pulse: 90   SpO2: 96%   Weight: 111 kg (244 lb)   Height: 177.8 cm (70\")     Body mass index is 35.01 kg/m².    Physical Exam:    Constitutional:  Well developed 54 y.o. male that appears in no apparent distress.  Awake & oriented times 3.  Normal mood with normal recent and remote memory and normal judgement.  Eyes:  Conjunctivae normal.  Oropharynx: Previously, moist mucous membranes without exudate and a large tongue and class III-IV Mallampati airway    Self-administered [place] Sleepiness Scale test results: 10  0-5 Lower normal daytime sleepiness  6-10 Higher normal daytime sleepiness  11-12 Mild, 13-15 Moderate, & 16-24 Severe excessive daytime sleepiness     Downloaded PAP Data Evaluated For Therapeutic Response and Compliance:  DME is [employer] and the patient uses a fullface mask.  Downloads between 2/20 and 5/20/2025 compliance 98%.  Average usage is 6 hours and 2 minutes.  Average AHI is normal with an " average large leak of 3 hours and 47 minutes.  Average auto CPAP pressure is 10 and his new DreamStation is set on 10-15    I have reviewed the above results and compared them with the patient's last downloads and reviewed with the patient.    Impression:   Moderate obstructive sleep apnea by overnight polysomnogram 2/14/2012, weight 245 pounds, adequately treated with new DreamStation auto CPAP. The patient appears to be at goal with good compliance and usage. The patient has no significant complaints of hypersomnolence since he continues to take armodafinil 250 mg one half tab daily.     Plan:  Good sleep hygiene measures should be maintained.  Weight loss would be beneficial in this patient who has class II severe obesity by Body mass index is 35.01 kg/m².  When last seen 11/20/2024 weight was 252 pounds and today he weighs 244 pounds.    Class 2 Severe Obesity (BMI >=35 and <=39.9). Obesity-related health conditions include the following: obstructive sleep apnea. Obesity is improving with lifestyle modifications. BMI is is above average; BMI management plan is completed. We discussed portion control and increasing exercise.       After evaluating the patient and assessing results available, the patient is benefiting from the treatment being provided.     The patient will continue new DreamStation auto CPAP.  Potential side effects of not using PAP therapy reviewed and addressed as needed.  After clinical evaluation and review of downloads, I recommend no changes to the patient's pressures.  However, the patient does have a large leak which I addressed with the patient.  A new prescription will be sent to the patient's DME.    The patient will continue armodafinil 250 mg one half tab daily. Edgard reviewed and there are no irregularities.      I answered all of the patient's questions.  The patient will call the [facility] Center for any problems and will follow up in 6 months.      Neil Lam,  MD  Sleep Medicine  05/26/25  07:21 EDT

## 2025-06-04 ENCOUNTER — TELEPHONE (OUTPATIENT)
Dept: SLEEP MEDICINE | Facility: HOSPITAL | Age: 55
End: 2025-06-04
Payer: MEDICARE

## 2025-06-04 DIAGNOSIS — G47.14 HYPERSOMNIA DUE TO MEDICAL CONDITION: ICD-10-CM

## 2025-06-04 DIAGNOSIS — G47.33 OSA ON CPAP: ICD-10-CM

## 2025-06-04 RX ORDER — ARMODAFINIL 250 MG/1
TABLET ORAL
Qty: 90 TABLET | Refills: 1 | Status: SHIPPED | OUTPATIENT
Start: 2025-06-04